# Patient Record
Sex: MALE | Race: WHITE | NOT HISPANIC OR LATINO | Employment: UNEMPLOYED | URBAN - METROPOLITAN AREA
[De-identification: names, ages, dates, MRNs, and addresses within clinical notes are randomized per-mention and may not be internally consistent; named-entity substitution may affect disease eponyms.]

---

## 2017-01-05 ENCOUNTER — APPOINTMENT (OUTPATIENT)
Dept: RADIOLOGY | Facility: MEDICAL CENTER | Age: 42
End: 2017-01-05
Attending: EMERGENCY MEDICINE

## 2017-01-05 ENCOUNTER — HOSPITAL ENCOUNTER (EMERGENCY)
Facility: MEDICAL CENTER | Age: 42
End: 2017-01-05

## 2017-01-05 ENCOUNTER — HOSPITAL ENCOUNTER (EMERGENCY)
Facility: MEDICAL CENTER | Age: 42
End: 2017-01-05
Attending: EMERGENCY MEDICINE

## 2017-01-05 VITALS
TEMPERATURE: 98.6 F | RESPIRATION RATE: 18 BRPM | BODY MASS INDEX: 38.95 KG/M2 | HEART RATE: 72 BPM | DIASTOLIC BLOOD PRESSURE: 78 MMHG | OXYGEN SATURATION: 96 % | HEIGHT: 68 IN | SYSTOLIC BLOOD PRESSURE: 132 MMHG | WEIGHT: 257 LBS

## 2017-01-05 VITALS
DIASTOLIC BLOOD PRESSURE: 76 MMHG | BODY MASS INDEX: 38.63 KG/M2 | TEMPERATURE: 97.9 F | HEART RATE: 86 BPM | WEIGHT: 261.69 LBS | OXYGEN SATURATION: 94 % | SYSTOLIC BLOOD PRESSURE: 141 MMHG | RESPIRATION RATE: 16 BRPM

## 2017-01-05 DIAGNOSIS — R16.1 SPLENOMEGALY: ICD-10-CM

## 2017-01-05 LAB
ALBUMIN SERPL BCP-MCNC: 4 G/DL (ref 3.2–4.9)
ALBUMIN/GLOB SERPL: 1.3 G/DL
ALP SERPL-CCNC: 79 U/L (ref 30–99)
ALT SERPL-CCNC: 39 U/L (ref 2–50)
ANION GAP SERPL CALC-SCNC: 7 MMOL/L (ref 0–11.9)
AST SERPL-CCNC: 21 U/L (ref 12–45)
BASOPHILS # BLD AUTO: 0.6 % (ref 0–1.8)
BASOPHILS # BLD: 0.07 K/UL (ref 0–0.12)
BILIRUB SERPL-MCNC: 0.6 MG/DL (ref 0.1–1.5)
BUN SERPL-MCNC: 12 MG/DL (ref 8–22)
CALCIUM SERPL-MCNC: 9 MG/DL (ref 8.4–10.2)
CHLORIDE SERPL-SCNC: 106 MMOL/L (ref 96–112)
CO2 SERPL-SCNC: 27 MMOL/L (ref 20–33)
CREAT SERPL-MCNC: 0.9 MG/DL (ref 0.5–1.4)
EOSINOPHIL # BLD AUTO: 0.2 K/UL (ref 0–0.51)
EOSINOPHIL NFR BLD: 1.8 % (ref 0–6.9)
ERYTHROCYTE [DISTWIDTH] IN BLOOD BY AUTOMATED COUNT: 41.1 FL (ref 35.9–50)
GFR SERPL CREATININE-BSD FRML MDRD: >60 ML/MIN/1.73 M 2
GLOBULIN SER CALC-MCNC: 3 G/DL (ref 1.9–3.5)
GLUCOSE SERPL-MCNC: 93 MG/DL (ref 65–99)
HCT VFR BLD AUTO: 47.6 % (ref 42–52)
HGB BLD-MCNC: 16.7 G/DL (ref 14–18)
IMM GRANULOCYTES # BLD AUTO: 0.04 K/UL (ref 0–0.11)
IMM GRANULOCYTES NFR BLD AUTO: 0.4 % (ref 0–0.9)
LYMPHOCYTES # BLD AUTO: 3.27 K/UL (ref 1–4.8)
LYMPHOCYTES NFR BLD: 28.9 % (ref 22–41)
MCH RBC QN AUTO: 30.6 PG (ref 27–33)
MCHC RBC AUTO-ENTMCNC: 35.1 G/DL (ref 33.7–35.3)
MCV RBC AUTO: 87.3 FL (ref 81.4–97.8)
MONOCYTES # BLD AUTO: 0.73 K/UL (ref 0–0.85)
MONOCYTES NFR BLD AUTO: 6.4 % (ref 0–13.4)
NEUTROPHILS # BLD AUTO: 7.01 K/UL (ref 1.82–7.42)
NEUTROPHILS NFR BLD: 61.9 % (ref 44–72)
NRBC # BLD AUTO: 0 K/UL
NRBC BLD AUTO-RTO: 0 /100 WBC
PLATELET # BLD AUTO: 192 K/UL (ref 164–446)
PMV BLD AUTO: 12.1 FL (ref 9–12.9)
POTASSIUM SERPL-SCNC: 3.8 MMOL/L (ref 3.6–5.5)
PROT SERPL-MCNC: 7 G/DL (ref 6–8.2)
RBC # BLD AUTO: 5.45 M/UL (ref 4.7–6.1)
SODIUM SERPL-SCNC: 140 MMOL/L (ref 135–145)
WBC # BLD AUTO: 11.3 K/UL (ref 4.8–10.8)

## 2017-01-05 PROCEDURE — 76700 US EXAM ABDOM COMPLETE: CPT

## 2017-01-05 PROCEDURE — 36415 COLL VENOUS BLD VENIPUNCTURE: CPT

## 2017-01-05 PROCEDURE — 99283 EMERGENCY DEPT VISIT LOW MDM: CPT

## 2017-01-05 PROCEDURE — 302449 STATCHG TRIAGE ONLY (STATISTIC): Mod: EDC

## 2017-01-05 PROCEDURE — 80053 COMPREHEN METABOLIC PANEL: CPT

## 2017-01-05 PROCEDURE — 85025 COMPLETE CBC W/AUTO DIFF WBC: CPT

## 2017-01-05 ASSESSMENT — PAIN SCALES - WONG BAKER: WONGBAKER_NUMERICALRESPONSE: DOESN'T HURT AT ALL

## 2017-01-05 NOTE — ED AVS SNAPSHOT
1/5/2017          Gil Mueller  [address] [place] [address]    Dear Gil:    [facility] wants to ensure your discharge home is safe and you or your loved ones have had all your questions answered regarding your care after you leave the hospital.    You may receive a telephone call within two days of your discharge.  This call is to make certain you understand your discharge instructions as well as ensure we provided you with the best care possible during your stay with us.     The call will only last approximately 3-5 minutes and will be done by a nurse.    Once again, we want to ensure your discharge home is safe and that you have a clear understanding of any next steps in your care.  If you have any questions or concerns, please do not hesitate to contact us, we are here for you.  Thank you for choosing [facility] for your healthcare needs.    Sincerely,    Del Boles    [facility]

## 2017-01-05 NOTE — ED AVS SNAPSHOT
After Visit Summary                                                                                                                Gil Mueller   MRN: [id number]    Department:  [facility], Emergency Dept   Date of Visit:  1/5/2017            [facility], Emergency Dept    [address]    [place] [address]    Phone:  [phone]      You were seen by     Gil Ramirez M.D.      Your Diagnosis Was     Splenomegaly     R16.1       Follow-up Information     1. Follow up with Steven Limon M.D. In 1 week.    Specialty:  Family Medicine    Contact information    [address]  [place] [address] [phone]        Medication Information     Review all of your home medications and newly ordered medications with your primary doctor and/or pharmacist as soon as possible. Follow medication instructions as directed by your doctor and/or pharmacist.     Please keep your complete medication list with you and share with your physician. Update the information when medications are discontinued, doses are changed, or new medications (including over-the-counter products) are added; and carry medication information at all times in the event of emergency situations.               Medication List      ASK your doctor about these medications        Instructions    gabapentin 600 MG tablet   Commonly known as:  NEURONTIN    Take 600 mg by mouth 3 times a day.   Dose:  600 mg       hydrocodone-acetaminophen 7.5-325 MG per tablet   Commonly known as:  NORCO    Take 1 Tab by mouth every 8 hours as needed (pain).   Dose:  1 Tab       ibuprofen 800 MG Tabs   Commonly known as:  MOTRIN    Take 1 Tab by mouth every 8 hours as needed (pain).   Dose:  800 mg       lisinopril 10 MG Tabs   Commonly known as:  PRINIVIL    Take 1 Tab by mouth every day.   Dose:  10 mg               Procedures and tests performed during your visit     CBC WITH DIFFERENTIAL    COMP METABOLIC PANEL     ESTIMATED GFR    US-ABDOMEN COMPLETE SURVEY        Discharge Instructions       Obtain outpatient H pylori evaluation     Enlarged Spleen  The spleen is an organ located in the upper abdomen under your left ribs. It is a spongelike organ, about the size of an orange, which acts as a filter. The spleen is part of the lymph system and filters the blood. It removes old blood cells and abnormal blood cells. It is also part of the immune response and helps fight infections. An enlarged spleen (splenomegaly) is usually noticed when it is almost twice its normal size.  CAUSES   There are many possible causes of an enlarged spleen. These causes include:  · Infections (viral, bacterial, or parasitic).  · Liver cirrhosis and other liver diseases.  · Hemolytic anemia (types of anemia that lower your red blood cell count) and other blood diseases.  · Hypersplenism (reduction in many types of blood cells by an enlarged spleen).  · Blood cancers (leukemia, Hodgkin's disease).  · Metabolic disorders (Gaucher's disease, Gomez-Pick disease).  · Tumors and cysts.  · Pressure or blood clots in the veins of the spleen.  · Connective tissue disorders (lupus, rheumatoid arthritis with Felty's syndrome).  SYMPTOMS   An enlarged spleen may not always cause symptoms. If symptoms do occur, they may include:  · Pain in the upper left abdomen (pain may spread to the left shoulder or get worse when you take a breath).  · Feeling full without eating or eating only a small amount.  · Feeling tired.  · Chronic infections.  · Bleeding easily.  DIAGNOSIS   Tests may include:  · Physical examination of the left upper abdomen.  · Blood tests to check red and white blood cells and other proteins and enzymes.  · Imaging tests, such as abdominal ultrasonography, computerized X-ray scan (computed tomography, CT), and computerized magnetic scan (magnetic resonance imaging, MRI).  · Taking a tissue sample (biopsy) of the liver to examine  it.  · Examining a bone marrow biopsy sample.  TREATMENT   Treatment varies depending on the cause of the enlarged spleen. Treatment aims to manage the conditions that cause swelling of the spleen and reduce the size of the spleen. Treatment may include:  · Medications to eliminate infection or treat disease.  · Radiation therapy.  · Blood transfusions.  · Vaccinations.  If these treatments are not successful, or the cause cannot be determined, surgery to remove the spleen (splenectomy) may be recommended.  HOME CARE INSTRUCTIONS   · Take all medications as directed.  · Take all antibiotics, even if you start to feel better. Discuss with your caregiver the use of a probiotic supplement to prevent stomach upset.  · To avoid injury or a ruptured spleen:  ¨ Limit activities as directed.  ¨ Avoid contact sports.  ¨ Wear your seat belt in the car.  · See your caregiver for vaccinations, follow up examinations and testing as directed.  · Follow all of your caregiver's instructions on managing the conditions that cause your enlarged spleen.  PREVENTION   It is not always possible to prevent an enlarged spleen. Reduce your chances of developing an enlarged spleen:  · Practice good hygiene to prevent infection.  · Get recommended vaccines to prevent infection.  SEEK MEDICAL CARE IF:   · You develop a fever (more than 100.5°F [38.1° C]) or other signs of infection (chills, feeling unwell).  · You experience injury or impact to the spleen area.  · Your symptoms do not go away as you and your doctor expected.  · You experience increased pain when you take in a breath.  · Your symptoms worsen, or you develop new symptoms.  MAKE SURE YOU:   · Understand these instructions.  · Will watch your condition.  · Will get help right away if you are not doing well or get worse.  Follow up with your caregiver to find out the results of your tests. Not all test results may be available during your visit. If your test results are not back  during the visit, make an appointment with your caregiver to find out the results. Do not assume everything is normal if you have not heard from your caregiver or the medical facility. It is important for you to follow up on all of your test results.      This information is not intended to replace advice given to you by your health care provider. Make sure you discuss any questions you have with your health care provider.     Document Released: 06/07/2011 Document Revised: 01/08/2016 Document Reviewed: 06/07/2011  [employer] Interactive Patient Education ©2016 [employer]            Patient Information     Patient Information    Following emergency treatment: all patient requiring follow-up care must return either to a private physician or a clinic if your condition worsens before you are able to obtain further medical attention, please return to the emergency room.     Billing Information    At [facility], we work to make the billing process streamlined for our patients.  Our Representatives are here to answer any questions you may have regarding your hospital bill.  If you have insurance coverage and have supplied your insurance information to us, we will submit a claim to your insurer on your behalf.  Should you have any questions regarding your bill, we can be reached online or by phone as follows:  Online: You are able pay your bills online or live chat with our representatives about any billing questions you may have. We are here to help Monday - Friday from 8:00am to 7:30pm and 9:00am - 12:00pm on Saturdays.  Please visit [email or web address]  for more information.   Phone:  [phone] or [phone]    Please note that your emergency physician, surgeon, pathologist, radiologist, anesthesiologist, and other specialists are not employed by [facility] and will therefore bill separately for their services.  Please contact them directly for any questions concerning their  bills at the numbers below:     Emergency Physician Services:  [phone]  [facility]:  [phone]  [facility]:  [phone]  [facility]:  [phone]    1. Your final bill may vary from the amount quoted upon discharge if all procedures are not complete at that time, or if your doctor has additional procedures of which we are not aware. You will receive an additional bill if you return to the Emergency Department at [facility] for suture removal regardless of the facility of which the sutures were placed.     2. Please arrange for settlement of this account at the emergency registration.    3. All self-pay accounts are due in full at the time of treatment.  If you are unable to meet this obligation then payment is expected within 4-5 days.     4. If you have had radiology studies (CT, X-ray, Ultrasound, MRI), you have received a preliminary result during your emergency department visit. Please contact the radiology department [phone] to receive a copy of your final result. Please discuss the Final result with your primary physician or with the follow up physician provided.     Crisis Hotline:  [place] Crisis Hotline:  6-857-WIGKXRA or 1-358.673.3078  [place] Crisis Hotline:    1-755.789.2499 or 323-330-5384         ED Discharge Follow Up Questions    1. In order to provide you with very good care, we would like to follow up with a phone call in the next few days.  May we have your permission to contact you?     YES /  NO    2. What is the best phone number to call you? (       )_____-__________    3. What is the best time to call you?      Morning  /  Afternoon  /  Evening                   Patient Signature:  ____________________________________________________________    Date:  ____________________________________________________________

## 2017-01-05 NOTE — ED AVS SNAPSHOT
Blacksumac Access Code: [email or web address]  Expires: 2/4/2017  8:24 PM    Blacksumac  A secure, online tool to manage your health information     [email or web address]’s Blacksumac® is a secure, online tool that connects you to your personalized health information from the privacy of your home -- day or night - making it very easy for you to manage your healthcare. Once the activation process is completed, you can even access your medical information using the Blacksumac el, which is available for free in the Apple El store or Google Play store.     Blacksumac provides the following levels of access (as shown below):   My Chart Features   [facility] Primary Care Doctor [facility]  Specialists [facility]  Urgent  Care [facility]  Primary Care  Doctor   Email your healthcare team securely and privately 24/7 X X X X   Manage appointments: schedule your next appointment; view details of past/upcoming appointments X      Request prescription refills. X      View recent personal medical records, including lab and immunizations X X X X   View health record, including health history, allergies, medications X X X X   Read reports about your outpatient visits, procedures, consult and ER notes X X X X   See your discharge summary, which is a recap of your hospital and/or ER visit that includes your diagnosis, lab results, and care plan. X X       How to register for Blacksumac:  1. Go to  [email or web address].  2. Click on the Sign Up Now box, which takes you to the New Member Sign Up page. You will need to provide the following information:  a. Enter your Blacksumac Access Code exactly as it appears at the top of this page. (You will not need to use this code after you’ve completed the sign-up process. If you do not sign up before the expiration date, you must request a new code.)   b. Enter your date of birth.   c. Enter your home email address.   d. Click Submit, and follow the next screen’s instructions.  3. Create a Blacksumac ID. This will be your [employer]  login ID and cannot be changed, so think of one that is secure and easy to remember.  4. Create a [employer] password. You can change your password at any time.  5. Enter your Password Reset Question and Answer. This can be used at a later time if you forget your password.   6. Enter your e-mail address. This allows you to receive e-mail notifications when new information is available in [employer].  7. Click Sign Up. You can now view your health information.    For assistance activating your [employer] account, call [phone]

## 2017-01-05 NOTE — ED NOTES
Pt had MRI done last week. MD called pt to notify him that he has an enlarged spleen and that he needs to come to ER. Pt in NAD. VSS. Charge RN aware.

## 2017-01-05 NOTE — ED NOTES
Patient called again in lobby and [facility] with [facility] response - assume left prior to treatment rendered.

## 2017-01-06 NOTE — DISCHARGE INSTRUCTIONS
Obtain outpatient H pylori evaluation     Enlarged Spleen  The spleen is an organ located in the upper abdomen under your left ribs. It is a spongelike organ, about the size of an orange, which acts as a filter. The spleen is part of the lymph system and filters the blood. It removes old blood cells and abnormal blood cells. It is also part of the immune response and helps fight infections. An enlarged spleen (splenomegaly) is usually noticed when it is almost twice its normal size.  CAUSES   There are many possible causes of an enlarged spleen. These causes include:  · Infections (viral, bacterial, or parasitic).  · Liver cirrhosis and other liver diseases.  · Hemolytic anemia (types of anemia that lower your red blood cell count) and other blood diseases.  · Hypersplenism (reduction in many types of blood cells by an enlarged spleen).  · Blood cancers (leukemia, Hodgkin's disease).  · Metabolic disorders (Gaucher's disease, Gomez-Pick disease).  · Tumors and cysts.  · Pressure or blood clots in the veins of the spleen.  · Connective tissue disorders (lupus, rheumatoid arthritis with Felty's syndrome).  SYMPTOMS   An enlarged spleen may not always cause symptoms. If symptoms do occur, they may include:  · Pain in the upper left abdomen (pain may spread to the left shoulder or get worse when you take a breath).  · Feeling full without eating or eating only a small amount.  · Feeling tired.  · Chronic infections.  · Bleeding easily.  DIAGNOSIS   Tests may include:  · Physical examination of the left upper abdomen.  · Blood tests to check red and white blood cells and other proteins and enzymes.  · Imaging tests, such as abdominal ultrasonography, computerized X-ray scan (computed tomography, CT), and computerized magnetic scan (magnetic resonance imaging, MRI).  · Taking a tissue sample (biopsy) of the liver to examine it.  · Examining a bone marrow biopsy sample.  TREATMENT   Treatment varies depending on the  cause of the enlarged spleen. Treatment aims to manage the conditions that cause swelling of the spleen and reduce the size of the spleen. Treatment may include:  · Medications to eliminate infection or treat disease.  · Radiation therapy.  · Blood transfusions.  · Vaccinations.  If these treatments are not successful, or the cause cannot be determined, surgery to remove the spleen (splenectomy) may be recommended.  HOME CARE INSTRUCTIONS   · Take all medications as directed.  · Take all antibiotics, even if you start to feel better. Discuss with your caregiver the use of a probiotic supplement to prevent stomach upset.  · To avoid injury or a ruptured spleen:  ¨ Limit activities as directed.  ¨ Avoid contact sports.  ¨ Wear your seat belt in the car.  · See your caregiver for vaccinations, follow up examinations and testing as directed.  · Follow all of your caregiver's instructions on managing the conditions that cause your enlarged spleen.  PREVENTION   It is not always possible to prevent an enlarged spleen. Reduce your chances of developing an enlarged spleen:  · Practice good hygiene to prevent infection.  · Get recommended vaccines to prevent infection.  SEEK MEDICAL CARE IF:   · You develop a fever (more than 100.5°F [38.1° C]) or other signs of infection (chills, feeling unwell).  · You experience injury or impact to the spleen area.  · Your symptoms do not go away as you and your doctor expected.  · You experience increased pain when you take in a breath.  · Your symptoms worsen, or you develop new symptoms.  MAKE SURE YOU:   · Understand these instructions.  · Will watch your condition.  · Will get help right away if you are not doing well or get worse.  Follow up with your caregiver to find out the results of your tests. Not all test results may be available during your visit. If your test results are not back during the visit, make an appointment with your caregiver to find out the results. Do not  assume everything is normal if you have not heard from your caregiver or the medical facility. It is important for you to follow up on all of your test results.      This information is not intended to replace advice given to you by your health care provider. Make sure you discuss any questions you have with your health care provider.     Document Released: 06/07/2011 Document Revised: 01/08/2016 Document Reviewed: 06/07/2011  Elsevier Interactive Patient Education ©2016 [employer].

## 2017-01-06 NOTE — ED NOTES
Assumed care of pt from Fletcher silva. Aware of pending u/s. Labs drawn by previous rn. Pt requesting oxycodone for previous back surgery. erp aware, not give as pt drove self to er and cannot find ride. Pt aware of same.

## 2017-01-06 NOTE — ED PROVIDER NOTES
ED Provider Note    CHIEF COMPLAINT  Chief Complaint   Patient presents with   • Sent by MD     enlarged spleen       HPI  Gil Mueller is a 41 y.o. male who presents after being referred in by his primary care doctor. He was told that he might have an enlarged spleen is seen on a CT scan of his belly. Patient denies any abdominal pain. Patient denies fever chills. No cough or cold symptoms. No vomiting or diarrhea. Patient is otherwise well. Patient states he has chronic back pain which she takes oxycodone for. He denies alcohol usage.    REVIEW OF SYSTEMS  See HPI for further details.  All other systems are negative.    PAST MEDICAL HISTORY  Past Medical History   Diagnosis Date   • Lumbar herniated disc    • Pain        FAMILY HISTORY  Family History   Problem Relation Age of Onset   • Cancer Mother      pancreas, liver   • Diabetes Neg Hx    • Heart Disease Neg Hx    • Stroke Neg Hx        SOCIAL HISTORY  Social History     Social History   • Marital Status:      Spouse Name: N/A   • Number of Children: N/A   • Years of Education: N/A     Social History Main Topics   • Smoking status: Former Smoker     Types: Cigarettes     Quit date: 03/12/2004   • Smokeless tobacco: Current User     Types: Chew   • Alcohol Use: Yes      Comment: socially   • Drug Use: No      Comment: DC'd  meth 2005   • Sexual Activity:     Partners: Female      Comment: ,      Other Topics Concern   • None     Social History Narrative       SURGICAL HISTORY  Past Surgical History   Procedure Laterality Date   • Other orthopedic surgery  age 27     L4-L5 discectomy   • Knee arthroscopy Left 2013     medial meniscus tear   • Hernia repair Left age 13   • Lumbar fusion posterior Right 7/26/2016     Procedure: LUMBAR FUSION POSTERIOR For: Coflex Stabilization L4-5;  Surgeon: Jose Benites M.D.;  Location: SUR[facility];  Service:    • Lumbar laminectomy diskectomy Right 7/26/2016     Procedure:  "LUMBAR LAMINECTOMY DISKECTOMY L4-5 Lami;  Surgeon: Jose Benites M.D.;  Location: SURGERY [facility];  Service:        CURRENT MEDICATIONS  @ He is Tim@    ALLERGIES  No Known Allergies    PHYSICAL EXAM  VITAL SIGNS: /78 mmHg  Pulse 70  Temp(Src) 37 °C (98.6 °F)  Resp 18  Ht 1.727 m (5' 8\")  Wt 116.574 kg (257 lb)  BMI 39.09 kg/m2  SpO2 97%  Constitutional: Well developed, Well nourished, No acute distress, Non-toxic appearance.   HENT: Normocephalic, Atraumatic, Bilateral external ears normal, Oropharynx moist, No oral exudates, Nose normal.   Eyes: SINDI, EOMI, Conjunctiva normal, No discharge.   Neck: Normal range of motion, No tenderness, Supple,   Lymphatic: No lymphadenopathy noted.   Cardiovascular: Normal heart rate, Normal rhythm, No murmurs, No rubs, No gallops.   Thorax & Lungs: Normal breath sounds, No respiratory distress,   Abdomen: Normal bowel sounds soft. Nontender. No spleen tip palpable  Musculoskeletal: Normal upper and lower extremities  Skin: Warm, Dry, No erythema, No rash.   Back: No tenderness, No CVA tenderness.   Extremities: No edema, No tenderness, No cyanosis, No clubbing. Dorsalis pedis pulses 2+ equal bilaterally. Radial pulses 2+ equal bilaterally    RADIOLOGY/PROCEDURES  US-ABDOMEN COMPLETE SURVEY   Final Result      Borderline splenomegaly. Unremarkable findings otherwise.               Results for orders placed or performed during the hospital encounter of 01/05/17   CBC WITH DIFFERENTIAL   Result Value Ref Range    WBC 11.3 (H) 4.8 - 10.8 K/uL    RBC 5.45 4.70 - 6.10 M/uL    Hemoglobin 16.7 14.0 - 18.0 g/dL    Hematocrit 47.6 42.0 - 52.0 %    MCV 87.3 81.4 - 97.8 fL    MCH 30.6 27.0 - 33.0 pg    MCHC 35.1 33.7 - 35.3 g/dL    RDW 41.1 35.9 - 50.0 fL    Platelet Count 192 164 - 446 K/uL    MPV 12.1 9.0 - 12.9 fL    Neutrophils-Polys 61.90 44.00 - 72.00 %    Lymphocytes 28.90 22.00 - 41.00 %    Monocytes 6.40 0.00 - 13.40 %    Eosinophils 1.80 0.00 - 6.90 %    " Basophils 0.60 0.00 - 1.80 %    Immature Granulocytes 0.40 0.00 - 0.90 %    Nucleated RBC 0.00 /100 WBC    Neutrophils (Absolute) 7.01 1.82 - 7.42 K/uL    Lymphs (Absolute) 3.27 1.00 - 4.80 K/uL    Monos (Absolute) 0.73 0.00 - 0.85 K/uL    Eos (Absolute) 0.20 0.00 - 0.51 K/uL    Baso (Absolute) 0.07 0.00 - 0.12 K/uL    Immature Granulocytes (abs) 0.04 0.00 - 0.11 K/uL    NRBC (Absolute) 0.00 K/uL   COMP METABOLIC PANEL   Result Value Ref Range    Sodium 140 135 - 145 mmol/L    Potassium 3.8 3.6 - 5.5 mmol/L    Chloride 106 96 - 112 mmol/L    Co2 27 20 - 33 mmol/L    Anion Gap 7.0 0.0 - 11.9    Glucose 93 65 - 99 mg/dL    Bun 12 8 - 22 mg/dL    Creatinine 0.90 0.50 - 1.40 mg/dL    Calcium 9.0 8.4 - 10.2 mg/dL    AST(SGOT) 21 12 - 45 U/L    ALT(SGPT) 39 2 - 50 U/L    Alkaline Phosphatase 79 30 - 99 U/L    Total Bilirubin 0.6 0.1 - 1.5 mg/dL    Albumin 4.0 3.2 - 4.9 g/dL    Total Protein 7.0 6.0 - 8.2 g/dL    Globulin 3.0 1.9 - 3.5 g/dL    A-G Ratio 1.3 g/dL   ESTIMATED GFR   Result Value Ref Range    GFR If African American >60 >60 mL/min/1.73 m 2    GFR If Non African American >60 >60 mL/min/1.73 m 2         COURSE & MEDICAL DECISION MAKING  Pertinent Labs & Imaging studies reviewed. (See chart for details)  Patient a blood work obtained. His CBC is essentially normal. His white count is minimally elevated 11,000. His blood count is normal. His left lites are normal. LFTs are normal. Ultrasound also shows showed splenomegaly. At this point do not think the patient needs to be admitted. There is no acute sign of splenic trauma or sequestration. Patient was reassured and will be discharged home. I recommend following up his primary care physician. He may or may not benefit from hematology referral. Patient is discharged home in stable condition    FINAL IMPRESSION  1. Borderline splenomegaly  2.   3.    Please note that this dictation was created using voice recognition software. I have worked with consultants from the  vendor as well as technical experts from [facility] to optimize the interface. I have made every reasonable attempt to correct obvious errors, but I expect that there are errors of grammar and possibly content that I did not discover before finalizing the note.    Electronically signed by: Gil Ramirez, 1/5/2017 8:15 PM

## 2017-04-18 ENCOUNTER — OFFICE VISIT (OUTPATIENT)
Dept: MEDICAL GROUP | Facility: MEDICAL CENTER | Age: 42
End: 2017-04-18
Attending: FAMILY MEDICINE
Payer: MEDICAID

## 2017-04-18 VITALS
HEIGHT: 69 IN | TEMPERATURE: 99.1 F | WEIGHT: 263 LBS | BODY MASS INDEX: 38.95 KG/M2 | HEART RATE: 76 BPM | OXYGEN SATURATION: 96 % | RESPIRATION RATE: 16 BRPM | DIASTOLIC BLOOD PRESSURE: 78 MMHG | SYSTOLIC BLOOD PRESSURE: 122 MMHG

## 2017-04-18 DIAGNOSIS — M51.26 LUMBAR HERNIATED DISC: ICD-10-CM

## 2017-04-18 DIAGNOSIS — E66.9 OBESITY (BMI 30-39.9): ICD-10-CM

## 2017-04-18 DIAGNOSIS — R16.1 SPLENOMEGALY: ICD-10-CM

## 2017-04-18 DIAGNOSIS — Z79.891 USE OF OPIATES FOR THERAPEUTIC PURPOSES: ICD-10-CM

## 2017-04-18 PROCEDURE — 99214 OFFICE O/P EST MOD 30 MIN: CPT | Performed by: FAMILY MEDICINE

## 2017-04-18 RX ORDER — OXYCODONE HYDROCHLORIDE 10 MG/1
10 TABLET ORAL 2 TIMES DAILY PRN
Qty: 42 TAB | Refills: 0 | Status: SHIPPED | OUTPATIENT
Start: 2017-04-18 | End: 2017-04-28

## 2017-04-18 ASSESSMENT — PAIN SCALES - GENERAL: PAINLEVEL: NO PAIN

## 2017-04-18 ASSESSMENT — PATIENT HEALTH QUESTIONNAIRE - PHQ9: CLINICAL INTERPRETATION OF PHQ2 SCORE: 0

## 2017-04-18 NOTE — MR AVS SNAPSHOT
"        Gil Mueller   2017 11:10 AM   Office Visit   MRN: [id number]    Department[facility]   Dept Phone:  [phone]    Description:  Male : 1975   Provider:  Steven Limon M.D.           Reason for Visit     Nail Problem     Other enlarged spleen       Allergies as of 2017     No Known Allergies      You were diagnosed with     Obesity (BMI 30-39.9)   [583414]       Use of opiates for therapeutic purposes   [3246885]       Splenomegaly   [789.2.ICD-9-CM]         Vital Signs     Blood Pressure Pulse Temperature Respirations Height Weight    122/78 mmHg 76 37.3 °C (99.1 °F) 16 1.753 m (5' 9\") 119.296 kg (263 lb)    Body Mass Index Oxygen Saturation Smoking Status             38.82 kg/m2 96% Former Smoker         Basic Information     Date Of Birth Sex Race Ethnicity Preferred Language    1975 Male White Non- English      Your appointments     2017  8:45 AM   Scheduled Pre Admission with PREADMIT 5   [facility] [place] [facility] [place]    [address] [place] [id number] [phone]            May 09, 2017  9:10 AM   Established Patient with Steven Limon M.D.   [facility] ([facility])    [address] [place] [id number] [phone]           You will be receiving a confirmation call a few days before your appointment from our automated call confirmation system.              Problem List              ICD-10-CM Priority Class Noted - Resolved    Lumbar herniated disc M51.26   Unknown - Present    Acute pain of right knee M25.561   2015 - Present    Morbid obesity (CMS-HCC) E66.01   2015 - Present    HTN (hypertension) I10   2015 - Present    Lumbar radiculopathy M54.16   2016 - Present      Health Maintenance        Date Due Completion Dates    IMM DTaP/Tdap/Td Vaccine (1 - Tdap) 1993 ---            Current Immunizations     No immunizations on file.      Below and/or attached are the medications your provider " expects you to take. Review all of your home medications and newly ordered medications with your provider and/or pharmacist. Follow medication instructions as directed by your provider and/or pharmacist. Please keep your medication list with you and share with your provider. Update the information when medications are discontinued, doses are changed, or new medications (including over-the-counter products) are added; and carry medication information at all times in the event of emergency situations     Allergies:  No Known Allergies          Medications  Valid as of: April 18, 2017 - 11:57 AM    Generic Name Brand Name Tablet Size Instructions for use    Gabapentin (Tab) NEURONTIN 600 MG Take 600 mg by mouth 3 times a day.        Hydrocodone-Acetaminophen (Tab) NORCO 7.5-325 MG Take 1 Tab by mouth every 8 hours as needed (pain).        Ibuprofen (Tab) MOTRIN 800 MG Take 1 Tab by mouth every 8 hours as needed (pain).        Lisinopril (Tab) PRINIVIL 10 MG Take 1 Tab by mouth every day.        OxyCODONE HCl (Tab) ROXICODONE 10 MG Take 1 Tab by mouth 2 times a day as needed for Moderate Pain.        .                 Medicines prescribed today were sent to:     None      Medication refill instructions:       If your prescription bottle indicates you have medication refills left, it is not necessary to call your provider’s office. Please contact your pharmacy and they will refill your medication.    If your prescription bottle indicates you do not have any refills left, you may request refills at any time through one of the following ways: The online [employer] system (except Urgent Care), by calling your provider’s office, or by asking your pharmacy to contact your provider’s office with a refill request. Medication refills are processed only during regular business hours and may not be available until the next business day. Your provider may request additional information or to have a follow-up visit with you prior to  refilling your medication.   *Please Note: Medication refills are assigned a new Rx number when refilled electronically. Your pharmacy may indicate that no refills were authorized even though a new prescription for the same medication is available at the pharmacy. Please request the medicine by name with the pharmacy before contacting your provider for a refill.        Your To Do List     Future Labs/Procedures Complete By Expires    US-ABDOMEN COMPLETE SURVEY  As directed 10/19/2017      Referral     A referral request has been sent to our patient care coordination department. Please allow 3-5 business days for us to process this request and contact you either by phone or mail. If you do not hear from us by the 5th business day, please call us at [phone].        Other Notes About Your Plan     Fall Risk Done 07/31/2015           [employer] Access Code: Activation code not generated  Current [employer] Status: Active          Quit Tobacco Information     Do you want to quit using tobacco?    Quitting tobacco decreases risks of cancer, heart and lung disease, increases life expectancy, improves sense of taste and smell, and increases spending money, among other benefits.    If you are thinking about quitting, we can help.  • [facility] Quit Tobacco Program: 256.535.4015  o Program occurs weekly for four weeks and includes pharmacist consultation on products to support quitting smoking or chewing tobacco. A provider referral is needed for pharmacist consultation.  • Tobacco Users Help Hotline: 5-366-QUIT-NOW (768-3017) or https://[place].quitlogix.org/  o Free, confidential telephone and online coaching for [place] residents. Sessions are designed on a schedule that is convenient for you. Eligible clients receive free nicotine replacement therapy.  • Nationally: www.smokefree.gov  o Information and professional assistance to support both immediate and long-term needs as you become, and remain, a non-smoker. Smokefree.gov  allows you to choose the help that best fits your needs.

## 2017-04-18 NOTE — Clinical Note
April 18, 2017    Re:    Gil Mueller  [address]  [place] [address]        Dear Sir,    Gil is evaluated today for recent finding of splenomegaly. Ultrasound on 1/5/17 was notable for very minimal splenomegaly at 14 cm length with normal spleen being up to 13 cm. CBC at that time was unremarkable with no evidence of white blood cell dysfunction. Ultrasound is being repeated. Abdominal exam today was unremarkable. I do not feel that patient's current splenomegaly, which appears to be mild, should be a significant impediment to contemplated posterior approach spinal surgery. We are updating his cholesterol CBC, and will also arrange for second opinion from hematology.    If you have any questions or concerns, please don't hesitate to call.        Sincerely,        Steven Limon M.D.    Electronically Signed

## 2017-04-19 ENCOUNTER — APPOINTMENT (OUTPATIENT)
Dept: ADMISSIONS | Facility: MEDICAL CENTER | Age: 42
End: 2017-04-19
Payer: COMMERCIAL

## 2017-04-20 ENCOUNTER — APPOINTMENT (OUTPATIENT)
Dept: ADMISSIONS | Facility: MEDICAL CENTER | Age: 42
End: 2017-04-20
Payer: COMMERCIAL

## 2017-04-20 ENCOUNTER — TELEPHONE (OUTPATIENT)
Dept: HEMATOLOGY ONCOLOGY | Facility: MEDICAL CENTER | Age: 42
End: 2017-04-20

## 2017-04-20 NOTE — TELEPHONE ENCOUNTER
Left voicemail for patient requesting a return call to schedule New patient Hematology appointment. Ref: Steven Limon M.D., Dx: Splenomegaly

## 2017-04-24 PROBLEM — E66.9 OBESITY (BMI 30-39.9): Status: ACTIVE | Noted: 2017-04-24

## 2017-04-24 PROBLEM — R16.1 SPLENOMEGALY: Status: ACTIVE | Noted: 2017-04-24

## 2017-04-24 NOTE — ASSESSMENT & PLAN NOTE
Patient reports his back surgeons became very concerned with reference to mild splenomegaly noted incidentally on ER requested abdominal ultrasound January 2017. Patient has no prior history of splenomegaly, unexplained bleeding, or anemia. Normal spleen heights can be up to 13 cm. Patient has not had any petechiae, rashes, unexplained fevers. Appetite is normal.

## 2017-04-24 NOTE — PROGRESS NOTES
Chief Complaint   Patient presents with   • Nail Problem   • Other     enlarged spleen        HISTORY OF PRESENT ILLNESS: Patient is a 41 y.o. male established patient who presents today to follow-up on question of splenomegaly, chronic lumbar pain, obesity        Lumbar herniated disc  Patient has recently been seen by Dr. Townsend who is planning a new lumbar disc surgery (left-sided microdiscectomy L4-5). Patient has a chronic history of lumbar pain following previous disc surgery. Currently is taking oxycodone 10 mg twice daily. Denies constipation, diaphoresis, confusion. Notes daily low back pain left side    Splenomegaly  Patient reports his back surgeons became very concerned with reference to mild splenomegaly noted incidentally on ER requested abdominal ultrasound January 2017. Patient has no prior history of splenomegaly, unexplained bleeding, or anemia. Normal spleen heights can be up to 13 cm. Patient has not had any petechiae, rashes, unexplained fevers. Appetite is normal.    Obesity (BMI 30-39.9)  Patient denies any recent dramatic shifts in body weight up or down. Denies current hair loss or cold intolerance.      Patient Active Problem List    Diagnosis Date Noted   • Splenomegaly 04/24/2017   • Obesity (BMI 30-39.9) 04/24/2017   • Lumbar radiculopathy 07/26/2016   • HTN (hypertension) 08/21/2015   • Acute pain of right knee 07/31/2015   • Morbid obesity (CMS-HCC) 07/31/2015   • Lumbar herniated disc       Social history-single, not working  Allergies:Review of patient's allergies indicates no known allergies.    Current Outpatient Prescriptions   Medication Sig Dispense Refill   • oxycodone immediate release (ROXICODONE) 10 MG immediate release tablet Take 1 Tab by mouth 2 times a day as needed for Moderate Pain. 42 Tab 0   • gabapentin (NEURONTIN) 600 MG tablet Take 600 mg by mouth 3 times a day.     • lisinopril (PRINIVIL) 10 MG Tab Take 1 Tab by mouth every day. 30 Tab 11   • ibuprofen (MOTRIN)  "800 MG TABS Take 1 Tab by mouth every 8 hours as needed (pain). 90 Tab 1     No current facility-administered medications for this visit.       Social History   Substance Use Topics   • Smoking status: Former Smoker     Types: Cigarettes     Quit date: 03/12/2004   • Smokeless tobacco: Current User     Types: Chew   • Alcohol Use: Yes      Comment: socially       Family History   Problem Relation Age of Onset   • Cancer Mother      pancreas, liver   • Diabetes Neg Hx    • Heart Disease Neg Hx    • Stroke Neg Hx        ROS:  Review of Systems   Constitutional: Negative for fever, chills, weight loss and malaise/fatigue.   Eyes: Negative for blurred vision.   Respiratory: Negative for cough, sputum production, shortness of breath and wheezing.    Cardiovascular: Negative for chest pain, palpitations, orthopnea and leg swelling.   Gastrointestinal: Negative for heartburn, nausea, vomiting and abdominal pain.   Endo/Heme/Allergies: Does not bruise/bleed easily.               Exam:  Blood pressure 122/78, pulse 76, temperature 37.3 °C (99.1 °F), resp. rate 16, height 1.753 m (5' 9\"), weight 119.296 kg (263 lb), SpO2 96 %.  General:  Well nourished, well developed male in NAD  Head is grossly normal.  Neck: Supple without JVD or bruit. Thyroid is not enlarged. Trachea is midline.  Pulmonary: Clear to ausculation .  Normal effort. No rales, ronchi, or wheezing.  Cardiovascular: Regular rate and rhythm without murmur.  Abdomen-Abdomen is soft, normal bowel sounds, no masses, guarding, ororganomegaly, or tenderness. I do not palpate a splenic edge in the left upper quadrant.  Lower extremities- neg for pretibial edema, redness, tenderness.  Back-1+ tender in the midline from L3-S2 without overlying swelling or redness.    Please note that this dictation was created using voice recognition software. I have made every reasonable attempt to correct obvious errors, but I expect that there are errors of grammar and possibly " content that I did not discover before finalizing the note.    Assessment/Plan:  1. Obesity (BMI 30-39.9)  Patient identified as having weight management issue.  Appropriate orders and counseling given.   2. Use of opiates for therapeutic purposes     3. Splenomegaly  US-ABDOMEN COMPLETE SURVEY    REFERRAL TO HEMATOLOGY ONCOLOGY Referral to?: [facility]   4. Lumbar herniated disc        Plan: 1. Repeat abdominal ultrasound to confirm stability of minimal splenic enlargement  2. Hematology second opinion regarding stability of mild splenomegaly  3. Update CBC  4. Patient cleared for pending spine surgery-I do not feel that the minimal splenic enlargement represents a significant risk for his lumbar microdiscectomy surgery. Patient is reporting they are planning a posterior not anterior approach.  5. Will cover with oxycodone 10 mg twice daily for the next 3 weeks leading up to his scheduled surgery

## 2017-04-24 NOTE — ASSESSMENT & PLAN NOTE
Patient denies any recent dramatic shifts in body weight up or down. Denies current hair loss or cold intolerance.

## 2017-04-24 NOTE — ASSESSMENT & PLAN NOTE
Patient has recently been seen by Dr. Townsend who is planning a new lumbar disc surgery. Patient has a chronic history of lumbar pain following previous disc surgery. Currently is taking oxycodone 10 mg twice daily. Denies constipation, diaphoresis, confusion.

## 2017-04-28 ENCOUNTER — HOSPITAL ENCOUNTER (OUTPATIENT)
Dept: RADIOLOGY | Facility: MEDICAL CENTER | Age: 42
End: 2017-04-28
Attending: ORTHOPAEDIC SURGERY | Admitting: ORTHOPAEDIC SURGERY
Payer: COMMERCIAL

## 2017-04-28 PROCEDURE — 71020 DX-CHEST-2 VIEWS: CPT

## 2017-05-04 RX ORDER — MIDAZOLAM HYDROCHLORIDE 1 MG/ML
INJECTION INTRAMUSCULAR; INTRAVENOUS
Status: DISPENSED
Start: 2017-05-04 | End: 2017-05-05

## 2017-05-08 ENCOUNTER — APPOINTMENT (OUTPATIENT)
Dept: RADIOLOGY | Facility: MEDICAL CENTER | Age: 42
End: 2017-05-08
Attending: ORTHOPAEDIC SURGERY
Payer: COMMERCIAL

## 2017-05-08 ENCOUNTER — HOSPITAL ENCOUNTER (OUTPATIENT)
Facility: MEDICAL CENTER | Age: 42
End: 2017-05-08
Attending: ORTHOPAEDIC SURGERY | Admitting: ORTHOPAEDIC SURGERY
Payer: COMMERCIAL

## 2017-05-08 VITALS
HEART RATE: 82 BPM | TEMPERATURE: 97 F | BODY MASS INDEX: 39.69 KG/M2 | SYSTOLIC BLOOD PRESSURE: 120 MMHG | HEIGHT: 68 IN | OXYGEN SATURATION: 94 % | DIASTOLIC BLOOD PRESSURE: 63 MMHG | RESPIRATION RATE: 20 BRPM | WEIGHT: 261.91 LBS

## 2017-05-08 PROBLEM — M51.27 LUMBAGO-SCIATICA DUE TO DISPLACEMENT OF LUMBAR INTERVERTEBRAL DISC: Status: ACTIVE | Noted: 2017-05-08

## 2017-05-08 PROCEDURE — 160047 HCHG PACU  - EA ADDL 30 MINS PHASE II: Performed by: ORTHOPAEDIC SURGERY

## 2017-05-08 PROCEDURE — A9270 NON-COVERED ITEM OR SERVICE: HCPCS

## 2017-05-08 PROCEDURE — 160002 HCHG RECOVERY MINUTES (STAT): Performed by: ORTHOPAEDIC SURGERY

## 2017-05-08 PROCEDURE — 160048 HCHG OR STATISTICAL LEVEL 1-5: Performed by: ORTHOPAEDIC SURGERY

## 2017-05-08 PROCEDURE — 501445 HCHG STAPLER, SKIN DISP: Performed by: ORTHOPAEDIC SURGERY

## 2017-05-08 PROCEDURE — 160025 RECOVERY II MINUTES (STATS): Performed by: ORTHOPAEDIC SURGERY

## 2017-05-08 PROCEDURE — 501838 HCHG SUTURE GENERAL: Performed by: ORTHOPAEDIC SURGERY

## 2017-05-08 PROCEDURE — 72020 X-RAY EXAM OF SPINE 1 VIEW: CPT

## 2017-05-08 PROCEDURE — 500367 HCHG DRAIN KIT, HEMOVAC: Performed by: ORTHOPAEDIC SURGERY

## 2017-05-08 PROCEDURE — 700111 HCHG RX REV CODE 636 W/ 250 OVERRIDE (IP)

## 2017-05-08 PROCEDURE — A4450 NON-WATERPROOF TAPE: HCPCS | Performed by: ORTHOPAEDIC SURGERY

## 2017-05-08 PROCEDURE — 500440 HCHG DRESSING, STERILE ROLL (KERLIX): Performed by: ORTHOPAEDIC SURGERY

## 2017-05-08 PROCEDURE — 500125 HCHG BOVIE, HANDLE: Performed by: ORTHOPAEDIC SURGERY

## 2017-05-08 PROCEDURE — 160041 HCHG SURGERY MINUTES - EA ADDL 1 MIN LEVEL 4: Performed by: ORTHOPAEDIC SURGERY

## 2017-05-08 PROCEDURE — 160046 HCHG PACU - 1ST 60 MINS PHASE II: Performed by: ORTHOPAEDIC SURGERY

## 2017-05-08 PROCEDURE — 500864 HCHG NEEDLE, SPINAL 18G: Performed by: ORTHOPAEDIC SURGERY

## 2017-05-08 PROCEDURE — 700101 HCHG RX REV CODE 250

## 2017-05-08 PROCEDURE — 700102 HCHG RX REV CODE 250 W/ 637 OVERRIDE(OP)

## 2017-05-08 PROCEDURE — 502626 HCHG SURGIFLO HEMOSTATIC MATRIX 6ML: Performed by: ORTHOPAEDIC SURGERY

## 2017-05-08 PROCEDURE — 500885 HCHG PACK, JACKSON TABLE: Performed by: ORTHOPAEDIC SURGERY

## 2017-05-08 PROCEDURE — 501899 HCHG DURASEAL SEALANT SYSTEM 5ML: Performed by: ORTHOPAEDIC SURGERY

## 2017-05-08 PROCEDURE — 160009 HCHG ANES TIME/MIN: Performed by: ORTHOPAEDIC SURGERY

## 2017-05-08 PROCEDURE — 160029 HCHG SURGERY MINUTES - 1ST 30 MINS LEVEL 4: Performed by: ORTHOPAEDIC SURGERY

## 2017-05-08 PROCEDURE — 502240 HCHG MISC OR SUPPLY RC 0272: Performed by: ORTHOPAEDIC SURGERY

## 2017-05-08 PROCEDURE — A6222 GAUZE <=16 IN NO W/SAL W/O B: HCPCS | Performed by: ORTHOPAEDIC SURGERY

## 2017-05-08 PROCEDURE — 160035 HCHG PACU - 1ST 60 MINS PHASE I: Performed by: ORTHOPAEDIC SURGERY

## 2017-05-08 PROCEDURE — 501423 HCHG SPONGE, SURGIFOAM 12X7: Performed by: ORTHOPAEDIC SURGERY

## 2017-05-08 DEVICE — DURASEAL SEALANT SYSTEM 5ML - (5/BX): Type: IMPLANTABLE DEVICE | Site: BACK | Status: FUNCTIONAL

## 2017-05-08 RX ORDER — HYDROMORPHONE HYDROCHLORIDE 2 MG/ML
INJECTION, SOLUTION INTRAMUSCULAR; INTRAVENOUS; SUBCUTANEOUS
Status: COMPLETED
Start: 2017-05-08 | End: 2017-05-08

## 2017-05-08 RX ORDER — MEPERIDINE HYDROCHLORIDE 25 MG/ML
INJECTION INTRAMUSCULAR; INTRAVENOUS; SUBCUTANEOUS
Status: COMPLETED
Start: 2017-05-08 | End: 2017-05-08

## 2017-05-08 RX ORDER — OXYCODONE HCL 5 MG/5 ML
SOLUTION, ORAL ORAL
Status: COMPLETED
Start: 2017-05-08 | End: 2017-05-08

## 2017-05-08 RX ORDER — SODIUM CHLORIDE, SODIUM LACTATE, POTASSIUM CHLORIDE, CALCIUM CHLORIDE 600; 310; 30; 20 MG/100ML; MG/100ML; MG/100ML; MG/100ML
1000 INJECTION, SOLUTION INTRAVENOUS
Status: COMPLETED | OUTPATIENT
Start: 2017-05-08 | End: 2017-05-08

## 2017-05-08 RX ORDER — BUPIVACAINE HYDROCHLORIDE AND EPINEPHRINE 5; 5 MG/ML; UG/ML
INJECTION, SOLUTION EPIDURAL; INTRACAUDAL; PERINEURAL
Status: DISCONTINUED | OUTPATIENT
Start: 2017-05-08 | End: 2017-05-08 | Stop reason: HOSPADM

## 2017-05-08 RX ORDER — LIDOCAINE HYDROCHLORIDE 10 MG/ML
INJECTION, SOLUTION INFILTRATION; PERINEURAL
Status: COMPLETED
Start: 2017-05-08 | End: 2017-05-08

## 2017-05-08 RX ADMIN — OXYCODONE HYDROCHLORIDE: 5 SOLUTION ORAL at 16:31

## 2017-05-08 RX ADMIN — SODIUM CHLORIDE, SODIUM LACTATE, POTASSIUM CHLORIDE, CALCIUM CHLORIDE 1000 ML: 600; 310; 30; 20 INJECTION, SOLUTION INTRAVENOUS at 11:10

## 2017-05-08 RX ADMIN — MEPERIDINE HYDROCHLORIDE 25 MG: 25 INJECTION INTRAMUSCULAR; INTRAVENOUS; SUBCUTANEOUS at 16:15

## 2017-05-08 RX ADMIN — HYDROMORPHONE HYDROCHLORIDE 0.5 MG: 2 INJECTION INTRAMUSCULAR; INTRAVENOUS; SUBCUTANEOUS at 16:54

## 2017-05-08 RX ADMIN — FENTANYL CITRATE 50 MCG: 50 INJECTION, SOLUTION INTRAMUSCULAR; INTRAVENOUS at 16:31

## 2017-05-08 RX ADMIN — OXYCODONE HYDROCHLORIDE 5 MG: 5 SOLUTION ORAL at 16:31

## 2017-05-08 RX ADMIN — LIDOCAINE HYDROCHLORIDE: 10 INJECTION, SOLUTION INFILTRATION; PERINEURAL at 11:10

## 2017-05-08 RX ADMIN — FENTANYL CITRATE 50 MCG: 50 INJECTION, SOLUTION INTRAMUSCULAR; INTRAVENOUS at 16:45

## 2017-05-08 ASSESSMENT — PAIN SCALES - GENERAL
PAINLEVEL_OUTOF10: 7
PAINLEVEL_OUTOF10: 4
PAINLEVEL_OUTOF10: 6
PAINLEVEL_OUTOF10: 6
PAINLEVEL_OUTOF10: 4

## 2017-05-08 NOTE — IP AVS SNAPSHOT
" Home Care Instructions                                                                                                                Name:Gil Mueller  Medical Record Number:[id number]  CSN: [id number]    YOB: 1975   Age: 41 y.o.  Sex: male  HT:1.727 m (5' 7.99\") WT: 118.8 kg (261 lb 14.5 oz)          Admit Date: 5/8/2017     Discharge Date:   Today's Date: 5/8/2017  Attending Doctor:  Jose Benites M.D.                  Allergies:  Review of patient's allergies indicates no known allergies.                Discharge Instructions         ACTIVITY: Rest and take it easy for the first 24 hours.  A responsible adult is recommended to remain with you during that time.  It is normal to feel sleepy.  We encourage you to not do anything that requires balance, judgment or coordination.    MILD FLU-LIKE SYMPTOMS ARE NORMAL. YOU MAY EXPERIENCE GENERALIZED MUSCLE ACHES, THROAT IRRITATION, HEADACHE AND/OR SOME NAUSEA.    FOR 24 HOURS DO NOT:  Drive, operate machinery or run household appliances.  Drink beer or alcoholic beverages.   Make important decisions or sign legal documents.    SPECIAL INSTRUCTIONS:     Microdiskectomy, Care After  Refer to this sheet in the next few weeks. These instructions provide you with information on caring for yourself after your procedure. Your caregiver may also give you more specific instructions. Your treatment has been planned according to current medical practices, but problems sometimes occur. Call your caregiver if you have any problems or questions after your procedure.  HOME CARE INSTRUCTIONS   Pain Relief  · Put ice on the injured area.  ¨ Put ice in a plastic bag.  ¨ Place a towel between your skin and the bag.  ¨ Leave the ice on for about 20 minutes every hour. You may need to do this for several days.  · Only take pain medicine has been prescribed by the surgeon. Follow the directions carefully. Do not take over-the-counter pain medicine unless the surgeon says " it is okay.  · Avoid pain medicines such as aspirin and ibuprofen for several weeks. They increase the chances of bleeding.  · Do not drive if you are taking narcotic pain medicines. You may need to take a stool softener while taking narcotic pain medicine. Also eat foods high in fiber to prevent not being able to have a bowel movement (constipation). Fruits and vegetables are examples.  Wound Care  · Do not get the surgical cut (incision) wet until the surgeon says it is okay.  · Check the area around the incision often. Look for redness, swelling, or anything leaking from the wound.  Activity  · Take it easy for a while. A full recovery can take 4 to 6 weeks.  · Walk as much as possible.  · Do not sit for long periods of time during the first few weeks. Do not pull on things.  · Do not drive until your surgeon says it is okay. Avoid long trips in the car for several weeks.  · Do not lift anything heavier than 10 lb (4.5 kg) until your surgeon says it is safe.  · Ask your caregiver when you can resume other activities, such as work, driving, or sex. Avoid bending, squatting, and crawling.  Exercise  · Exercises are often needed to stretch the muscles in the back and to make the back stronger. Ask your caregiver what you should and should not do. The correct exercises can speed up your recovery. Physical therapy may be needed.  Follow-up Care  · The surgeon may need to take out stitches or staples. This is usually done about 2 weeks after the surgery.  · The surgeon may do X-rays to see how the disk area is healing.  SEEK MEDICAL CARE IF:   · You have any questions about medicines.  · Your pain continues, even after taking pain medicine.  · You feel sick to your stomach (nauseous).  · You are constipated.  SEEK IMMEDIATE MEDICAL CARE IF:   · Your pain suddenly becomes much worse, particularly if your leg pain increases.  · Your incision area is red, swollen, or bleeding.  · You have fluid leaking from the  incision.  · Your legs or feet become painful or swollen.  · You have trouble breathing.  · You have difficulty controlling urination or bowel movements.  · You have chest pain.  · You have a fever.  MAKE SURE YOU:  · Understand these instructions.  · Will watch your condition.  · Will get help right away if you are not doing well or get worse.     This information is not intended to replace advice given to you by your health care provider. Make sure you discuss any questions you have with your health care provider.     Document Released: 04/03/2012 Document Revised: 10/08/2014 Document Reviewed: 04/03/2012  [employer] Interactive Patient Education ©2016 [employer] Inc.      DIET: To avoid nausea, slowly advance diet as tolerated, avoiding spicy or greasy foods for the first day.  Add more substantial food to your diet according to your physician's instructions. INCREASE FLUIDS AND FIBER TO AVOID CONSTIPATION.    SURGICAL DRESSING/BATHING: Follow surgeon instructions    FOLLOW-UP APPOINTMENT:  A follow-up appointment should be arranged with your doctor in 1-2 weeks; call to schedule.    You should CALL YOUR PHYSICIAN if you develop:  Fever greater than 101 degrees F.  Pain not relieved by medication, or persistent nausea or vomiting.  Excessive bleeding (blood soaking through dressing) or unexpected drainage from the wound.  Extreme redness or swelling around the incision site, drainage of pus or foul smelling drainage.  Inability to urinate or empty your bladder within 8 hours.  Problems with breathing or chest pain.    You should call 911 if you develop problems with breathing or chest pain.  If you are unable to contact your doctor or surgical center, you should go to the nearest emergency room or urgent care center.  Physician's telephone #: [phone]    If any questions arise, call your doctor.  If your doctor is not available, please feel free to call the Surgical Center at [phone].  The Center is open  Monday through Friday from 7AM to 7PM.  You can also call the HEALTH HOTLINE open 24 hours/day, 7 days/week and speak to a nurse at [phone], or toll free at [phone].    A registered nurse may call you a few days after your surgery to see how you are doing after your procedure.    MEDICATIONS: Resume taking daily medication.  Take prescribed pain medication with food.  If no medication is prescribed, you may take non-aspirin pain medication if needed.  PAIN MEDICATION CAN BE VERY CONSTIPATING.  Take a stool softener or laxative such as senokot, pericolace, or milk of magnesia if needed.    Prescription given for Oxy, celebrex.  Last pain medication given at 4:30pm.    If your physician has prescribed pain medication that includes Acetaminophen (Tylenol), do not take additional Acetaminophen (Tylenol) while taking the prescribed medication.    Depression / Suicide Risk    As you are discharged from this [facility] facility, it is important to learn how to keep safe from harming yourself.    Recognize the warning signs:  · Abrupt changes in personality, positive or negative- including increase in energy   · Giving away possessions  · Change in eating patterns- significant weight changes-  positive or negative  · Change in sleeping patterns- unable to sleep or sleeping all the time   · Unwillingness or inability to communicate  · Depression  · Unusual sadness, discouragement and loneliness  · Talk of wanting to die  · Neglect of personal appearance   · Rebelliousness- reckless behavior  · Withdrawal from people/activities they love  · Confusion- inability to concentrate     If you or a loved one observes any of these behaviors or has concerns about self-harm, here's what you can do:  · Talk about it- your feelings and reasons for harming yourself  · Remove any means that you might use to hurt yourself (examples: pills, rope, extension cords, firearm)  · Get professional help from the community (Mental  Health, Substance Abuse, psychological counseling)  · Do not be alone:Call your Safe Contact- someone whom you trust who will be there for you.  · Call your local CRISIS HOTLINE 331-4584 or 402-701-7589  · Call your local Children's Mobile Crisis Response Team [place] (419) 807-1578 or www.Buzzoo  · Call the toll free National Suicide Prevention Hotlines   · National Suicide Prevention Lifeline 460-728-TESZ (1477)  · National Hope Line Network 800-SUICIDE (255-0720)       Medication List      ASK your doctor about these medications        Instructions    Morning Afternoon Evening Bedtime    gabapentin 600 MG tablet   Commonly known as:  NEURONTIN        Take 1,500 mg by mouth 3 times a day.   Dose:  1500 mg                        METAMUCIL PO        Take  by mouth every day.                                Medication Information     Above and/or attached are the medications your physician expects you to take upon discharge. Review all of your home medications and newly ordered medications with your doctor and/or pharmacist. Follow medication instructions as directed by your doctor and/or pharmacist. Please keep your medication list with you and share with your physician. Update the information when medications are discontinued, doses are changed, or new medications (including over-the-counter products) are added; and carry medication information at all times in the event of emergency situations.        Resources     Quit Smoking / Tobacco Use:    I understand the use of any tobacco products increases my chance of suffering from future heart disease or stroke and could cause other illnesses which may shorten my life. Quitting the use of tobacco products is the single most important thing I can do to improve my health. For further information on smoking / tobacco cessation call a Toll Free Quit Line at 1-771.261.1852 (*[employer] [place]) or 1-538.965.6231 (American Lung Association) or you can  access the web based program at www.[employer]Mobile Broad[employer] Network.org.    [place] Tobacco Users Help Line:  (674) 291-8926       Toll Free: 1-113.709.5539  Quit Tobacco Program [facility] (082)830-0044    Crisis Hotline:    [place] Crisis Hotline:  6-514-HPDLHIT or 1-204.858.8461    [place] Crisis Hotline:    1-126.891.9085 or 955-668-2868    Discharge Survey:   Thank you for choosing [facility]. We hope we did everything we could to make your hospital stay a pleasant one. You may be receiving a survey and we would appreciate your time and participation in answering the questions. Your input is very valuable to us in our efforts to improve our service to our patients and their families.            Signatures     My signature on this form indicates that:    1. I acknowledge receipt and understanding of these Home Care Instruction.  2. My questions regarding this information have been answered to my satisfaction.  3. I have formulated a plan with my discharge nurse to obtain my prescribed medications for home.    __________________________________      __________________________________                   Patient Signature                                 Guardian/Responsible Adult Signature      __________________________________                 __________       ________                       Nurse Signature                                               Date                 Time

## 2017-05-08 NOTE — IP AVS SNAPSHOT
5/8/2017    Gil Mueller  [address] [place] [address]    Dear Gil:    [facility] wants to ensure your discharge home is safe and you or your loved ones have had all of your questions answered regarding your care after you leave the hospital.    Below is a list of resources and contact information should you have any questions regarding your hospital stay, follow-up instructions, or active medical symptoms.    Questions or Concerns Regarding… Contact   Medical Questions Related to Your Discharge  (7 days a week, 8am-5pm) Contact a Nurse Care Coordinator   [phone]   Medical Questions Not Related to Your Discharge  (24 hours a day / 7 days a week)  Contact the Nurse Health Line   [phone]    Medications or Discharge Instructions Refer to your discharge packet   or contact your [facility] Primary Care Provider   [phone]   Follow-up Appointment(s) Schedule your appointment via [employer]   or contact Scheduling [phone]   Billing Review your statement via [employer]  or contact Billing [phone]   Medical Records Review your records via [employer]   or contact Medical Records [phone]     You may receive a telephone call within two days of discharge. This call is to make certain you understand your discharge instructions and have the opportunity to have any questions answered. You can also easily access your medical information, test results and upcoming appointments via the [employer] free online health management tool. You can learn more and sign up at [employer]. For assistance setting up your [employer] account, please call [phone].    Once again, we want to ensure your discharge home is safe and that you have a clear understanding of any next steps in your care. If you have any questions or concerns, please do not hesitate to contact us, we are here for you. Thank you for choosing [facility] for your healthcare needs.    Sincerely,    Your [facility] Healthcare Team

## 2017-05-09 NOTE — DISCHARGE INSTRUCTIONS
ACTIVITY: Rest and take it easy for the first 24 hours.  A responsible adult is recommended to remain with you during that time.  It is normal to feel sleepy.  We encourage you to not do anything that requires balance, judgment or coordination.    MILD FLU-LIKE SYMPTOMS ARE NORMAL. YOU MAY EXPERIENCE GENERALIZED MUSCLE ACHES, THROAT IRRITATION, HEADACHE AND/OR SOME NAUSEA.    FOR 24 HOURS DO NOT:  Drive, operate machinery or run household appliances.  Drink beer or alcoholic beverages.   Make important decisions or sign legal documents.    SPECIAL INSTRUCTIONS:     Microdiskectomy, Care After  Refer to this sheet in the next few weeks. These instructions provide you with information on caring for yourself after your procedure. Your caregiver may also give you more specific instructions. Your treatment has been planned according to current medical practices, but problems sometimes occur. Call your caregiver if you have any problems or questions after your procedure.  HOME CARE INSTRUCTIONS   Pain Relief  · Put ice on the injured area.  ¨ Put ice in a plastic bag.  ¨ Place a towel between your skin and the bag.  ¨ Leave the ice on for about 20 minutes every hour. You may need to do this for several days.  · Only take pain medicine has been prescribed by the surgeon. Follow the directions carefully. Do not take over-the-counter pain medicine unless the surgeon says it is okay.  · Avoid pain medicines such as aspirin and ibuprofen for several weeks. They increase the chances of bleeding.  · Do not drive if you are taking narcotic pain medicines. You may need to take a stool softener while taking narcotic pain medicine. Also eat foods high in fiber to prevent not being able to have a bowel movement (constipation). Fruits and vegetables are examples.  Wound Care  · Do not get the surgical cut (incision) wet until the surgeon says it is okay.  · Check the area around the incision often. Look for redness, swelling, or  anything leaking from the wound.  Activity  · Take it easy for a while. A full recovery can take 4 to 6 weeks.  · Walk as much as possible.  · Do not sit for long periods of time during the first few weeks. Do not pull on things.  · Do not drive until your surgeon says it is okay. Avoid long trips in the car for several weeks.  · Do not lift anything heavier than 10 lb (4.5 kg) until your surgeon says it is safe.  · Ask your caregiver when you can resume other activities, such as work, driving, or sex. Avoid bending, squatting, and crawling.  Exercise  · Exercises are often needed to stretch the muscles in the back and to make the back stronger. Ask your caregiver what you should and should not do. The correct exercises can speed up your recovery. Physical therapy may be needed.  Follow-up Care  · The surgeon may need to take out stitches or staples. This is usually done about 2 weeks after the surgery.  · The surgeon may do X-rays to see how the disk area is healing.  SEEK MEDICAL CARE IF:   · You have any questions about medicines.  · Your pain continues, even after taking pain medicine.  · You feel sick to your stomach (nauseous).  · You are constipated.  SEEK IMMEDIATE MEDICAL CARE IF:   · Your pain suddenly becomes much worse, particularly if your leg pain increases.  · Your incision area is red, swollen, or bleeding.  · You have fluid leaking from the incision.  · Your legs or feet become painful or swollen.  · You have trouble breathing.  · You have difficulty controlling urination or bowel movements.  · You have chest pain.  · You have a fever.  MAKE SURE YOU:  · Understand these instructions.  · Will watch your condition.  · Will get help right away if you are not doing well or get worse.     This information is not intended to replace advice given to you by your health care provider. Make sure you discuss any questions you have with your health care provider.     Document Released: 04/03/2012 Document  Revised: 10/08/2014 Document Reviewed: 04/03/2012  [email or web address] Interactive Patient Education ©2016 [employer]vier Inc.      DIET: To avoid nausea, slowly advance diet as tolerated, avoiding spicy or greasy foods for the first day.  Add more substantial food to your diet according to your physician's instructions. INCREASE FLUIDS AND FIBER TO AVOID CONSTIPATION.    SURGICAL DRESSING/BATHING: Follow surgeon instructions    FOLLOW-UP APPOINTMENT:  A follow-up appointment should be arranged with your doctor in 1-2 weeks; call to schedule.    You should CALL YOUR PHYSICIAN if you develop:  Fever greater than 101 degrees F.  Pain not relieved by medication, or persistent nausea or vomiting.  Excessive bleeding (blood soaking through dressing) or unexpected drainage from the wound.  Extreme redness or swelling around the incision site, drainage of pus or foul smelling drainage.  Inability to urinate or empty your bladder within 8 hours.  Problems with breathing or chest pain.    You should call 911 if you develop problems with breathing or chest pain.  If you are unable to contact your doctor or surgical center, you should go to the nearest emergency room or urgent care center.  Physician's telephone #: [phone]    If any questions arise, call your doctor.  If your doctor is not available, please feel free to call the Surgical Center at [phone].  The Center is open Monday through Friday from 7AM to 7PM.  You can also call the [place] HOTLINE open 24 hours/day, 7 days/week and speak to a nurse at [phone], or toll free at [phone].    A registered nurse may call you a few days after your surgery to see how you are doing after your procedure.    MEDICATIONS: Resume taking daily medication.  Take prescribed pain medication with food.  If no medication is prescribed, you may take non-aspirin pain medication if needed.  PAIN MEDICATION CAN BE VERY CONSTIPATING.  Take a stool softener or laxative such as senokot,  pericolace, or milk of magnesia if needed.    Prescription given for Oxy, celebrex.  Last pain medication given at 4:30pm.    If your physician has prescribed pain medication that includes Acetaminophen (Tylenol), do not take additional Acetaminophen (Tylenol) while taking the prescribed medication.    Depression / Suicide Risk    As you are discharged from this [facility] facility, it is important to learn how to keep safe from harming yourself.    Recognize the warning signs:  · Abrupt changes in personality, positive or negative- including increase in energy   · Giving away possessions  · Change in eating patterns- significant weight changes-  positive or negative  · Change in sleeping patterns- unable to sleep or sleeping all the time   · Unwillingness or inability to communicate  · Depression  · Unusual sadness, discouragement and loneliness  · Talk of wanting to die  · Neglect of personal appearance   · Rebelliousness- reckless behavior  · Withdrawal from people/activities they love  · Confusion- inability to concentrate     If you or a loved one observes any of these behaviors or has concerns about self-harm, here's what you can do:  · Talk about it- your feelings and reasons for harming yourself  · Remove any means that you might use to hurt yourself (examples: pills, rope, extension cords, firearm)  · Get professional help from the community (Mental Health, Substance Abuse, psychological counseling)  · Do not be alone:Call your Safe Contact- someone whom you trust who will be there for you.  · Call your local CRISIS HOTLINE 883-8803 or 994-591-6316  · Call your local Children's Mobile Crisis Response Team [place] (895) 184-3876 or www.interclick  · Call the toll free National Suicide Prevention Hotlines   · National Suicide Prevention Lifeline 423-791-YWPB (3014)  · National Hope Line Network 800-SUICIDE (311-1991)

## 2017-05-15 ENCOUNTER — APPOINTMENT (OUTPATIENT)
Dept: HEMATOLOGY ONCOLOGY | Facility: MEDICAL CENTER | Age: 42
End: 2017-05-15
Payer: MEDICAID

## 2017-06-20 ENCOUNTER — OFFICE VISIT (OUTPATIENT)
Dept: MEDICAL GROUP | Facility: MEDICAL CENTER | Age: 42
End: 2017-06-20
Attending: FAMILY MEDICINE
Payer: MEDICAID

## 2017-06-20 VITALS
SYSTOLIC BLOOD PRESSURE: 118 MMHG | HEIGHT: 69 IN | WEIGHT: 260 LBS | HEART RATE: 80 BPM | OXYGEN SATURATION: 95 % | BODY MASS INDEX: 38.51 KG/M2 | DIASTOLIC BLOOD PRESSURE: 72 MMHG | TEMPERATURE: 99.5 F | RESPIRATION RATE: 16 BRPM

## 2017-06-20 DIAGNOSIS — M51.27 LUMBAGO-SCIATICA DUE TO DISPLACEMENT OF LUMBAR INTERVERTEBRAL DISC: ICD-10-CM

## 2017-06-20 DIAGNOSIS — R45.4 IRRITABILITY AND ANGER: ICD-10-CM

## 2017-06-20 PROCEDURE — 99213 OFFICE O/P EST LOW 20 MIN: CPT | Performed by: FAMILY MEDICINE

## 2017-06-20 RX ORDER — DULOXETIN HYDROCHLORIDE 60 MG/1
60 CAPSULE, DELAYED RELEASE ORAL DAILY
Qty: 30 CAP | Refills: 6 | Status: SHIPPED | OUTPATIENT
Start: 2017-06-20 | End: 2020-02-05

## 2017-06-20 ASSESSMENT — ENCOUNTER SYMPTOMS: BACK PAIN: 1

## 2017-06-20 ASSESSMENT — PAIN SCALES - GENERAL: PAINLEVEL: 9=SEVERE PAIN

## 2017-06-20 NOTE — PROGRESS NOTES
"Subjective:      Gil Mueller is a 42 y.o. male who presents with Back Pain            Back Pain     1. Chronic lumbar pain/bilateral sciatica-patient underwent his second low back surgery on 5/8/17 with Dr. Benites. He reports immediately postoperatively numbness and pain in his left leg had resolved. He reports over the past several weeks he has had recurrence of numbness in his left thigh along with persistent numbness in the right thigh. He is not having leg pain but does note pain at the SI areas bilaterally posteriorly. He still continues to have markedly reduced low back mobility. Patient had discontinued his oxycodone in late April after our discussion about concerns over long-term use of opioids. He reports after surgery he only took Celebrex and no opiates. Patient expresses increasing levels of frustration and anger and irritability at times growing out of his back problem stemming from a work injury in [place] in December 2015.    Review of Systems   Musculoskeletal: Positive for back pain.     Negative for urinary incontinence, fecal incontinence, abdominal bloating     Objective:     /72 mmHg  Pulse 80  Temp(Src) 37.5 °C (99.5 °F)  Resp 16  Ht 1.753 m (5' 9.02\")  Wt 117.935 kg (260 lb)  BMI 38.38 kg/m2  SpO2 95%     Physical Exam   Gen.-alert cooperative male in no acute distress  Back-1+ tender on palpation over both SI joints  Psych-normal affect with good eye contact. Normal grooming. Oriented x4.            Assessment/Plan:     1. Lumbago-sciatica due to displacement of lumbar intervertebral disc      2. Irritability and anger      Plan: 1. Patient is encouraged to continue to communicate clearly with regard to residual numbness and pain he is experiencing symmetrically when talking to his surgeon, Dr. Benites  2. Trial of Cymbalta 60 mg by mouth daily to assist with depression and irritability and anger associated with ongoing loss of employment and chronic pain dating " back to injury in December 2015  3. Restart Celebrex 200 mg (available through his Workmen's Comp.)  4. Psychology referral  5. Revisit with me one month

## 2017-06-20 NOTE — MR AVS SNAPSHOT
"        Gil Mueller   2017 11:30 AM   Office Visit   MRN: [id number]    Department:  [facility]   Dept Phone:  [phone]    Description:  Male : 1975   Provider:  Steven Limon M.D.           Reason for Visit     Back Pain           Allergies as of 2017     No Known Allergies      You were diagnosed with     Lumbago-sciatica due to displacement of lumbar intervertebral disc   [481612]       Irritability and anger   [900672]         Vital Signs     Blood Pressure Pulse Temperature Respirations Height Weight    118/72 mmHg 80 37.5 °C (99.5 °F) 16 1.753 m (5' 9.02\") 117.935 kg (260 lb)    Body Mass Index Oxygen Saturation Smoking Status             38.38 kg/m2 95% Former Smoker         Basic Information     Date Of Birth Sex Race Ethnicity Preferred Language    1975 Male White Non- English      Your appointments     2017 11:30 AM   Established Patient with Steven Limon M.D.   [facility] ([facility])    [address] [place] [id number] [phone]           You will be receiving a confirmation call a few days before your appointment from our automated call confirmation system.            2017  2:40 PM   Hematology New Patient with Davy Macias M.D.   [facility] (--)    [address] [place] [address], [address]  [facility] [id number] [phone]            2017 11:30 AM   Established Patient with Steven Limon M.D.   [facility])    [address] [place] [id number] [phone]           You will be receiving a confirmation call a few days before your appointment from our automated call confirmation system.              Problem List              ICD-10-CM Priority Class Noted - Resolved    Lumbar herniated disc M51.26   Unknown - Present    Acute pain of right knee M25.561   2015 - Present    Morbid obesity (CMS-HCC) E66.01   2015 - Present    HTN (hypertension) I10   2015 " - Present    Lumbar radiculopathy M54.16   7/26/2016 - Present    Splenomegaly R16.1   4/24/2017 - Present    Obesity (BMI 30-39.9) E66.9   4/24/2017 - Present    Lumbago-sciatica due to displacement of lumbar intervertebral disc M51.27   5/8/2017 - Present      Health Maintenance        Date Due Completion Dates    IMM DTaP/Tdap/Td Vaccine (1 - Tdap) 5/21/1993 ---            Current Immunizations     No immunizations on file.      Below and/or attached are the medications your provider expects you to take. Review all of your home medications and newly ordered medications with your provider and/or pharmacist. Follow medication instructions as directed by your provider and/or pharmacist. Please keep your medication list with you and share with your provider. Update the information when medications are discontinued, doses are changed, or new medications (including over-the-counter products) are added; and carry medication information at all times in the event of emergency situations     Allergies:  No Known Allergies          Medications  Valid as of: June 20, 2017 - 11:23 AM    Generic Name Brand Name Tablet Size Instructions for use    DULoxetine HCl (Cap DR Particles) CYMBALTA 60 MG Take 1 Cap by mouth every day.        Gabapentin (Tab) NEURONTIN 600 MG Take 1,500 mg by mouth 3 times a day.        Psyllium   Take  by mouth every day.        .                 Medicines prescribed today were sent to:     [id number] - [place] - [address]    [address] [place] [address]    Phone: [phone] Fax: [phone]    Open 24 Hours?: No      Medication refill instructions:       If your prescription bottle indicates you have medication refills left, it is not necessary to call your provider’s office. Please contact your pharmacy and they will refill your medication.    If your prescription bottle indicates you do not have any refills left, you may request refills at any time through one of the  following ways: The online [employer] system (except Urgent Care), by calling your provider’s office, or by asking your pharmacy to contact your provider’s office with a refill request. Medication refills are processed only during regular business hours and may not be available until the next business day. Your provider may request additional information or to have a follow-up visit with you prior to refilling your medication.   *Please Note: Medication refills are assigned a new Rx number when refilled electronically. Your pharmacy may indicate that no refills were authorized even though a new prescription for the same medication is available at the pharmacy. Please request the medicine by name with the pharmacy before contacting your provider for a refill.        Referral     A referral request has been sent to our patient care coordination department. Please allow 3-5 business days for us to process this request and contact you either by phone or mail. If you do not hear from us by the 5th business day, please call us at [phone].        Other Notes About Your Plan     Fall Risk Done 07/31/2015           [employer]ogether [employer] Access Code: Activation code not generated  Current Together Mobile Status: Active          Quit Tobacco Information     Do you want to quit using tobacco?    Quitting tobacco decreases risks of cancer, heart and lung disease, increases life expectancy, improves sense of taste and smell, and increases spending money, among other benefits.    If you are thinking about quitting, we can help.  • [facility] Quit Tobacco Program: 404.300.3393  o Program occurs weekly for four weeks and includes pharmacist consultation on products to support quitting smoking or chewing tobacco. A provider referral is needed for pharmacist consultation.  • Tobacco Users Help Hotline: 2-800-QUIT-NOW (136-2986) or https://[place].quitlogix.org/  o Free, confidential telephone and online coaching for [place] residents. Sessions are designed  on a schedule that is convenient for you. Eligible clients receive free nicotine replacement therapy.  • Nationally: www.smokefree.gov  o Information and professional assistance to support both immediate and long-term needs as you become, and remain, a non-smoker. Smokefree.gov allows you to choose the help that best fits your needs.

## 2017-07-17 ENCOUNTER — TELEPHONE (OUTPATIENT)
Dept: HEMATOLOGY ONCOLOGY | Facility: MEDICAL CENTER | Age: 42
End: 2017-07-17

## 2017-07-17 ENCOUNTER — APPOINTMENT (OUTPATIENT)
Dept: HEMATOLOGY ONCOLOGY | Facility: MEDICAL CENTER | Age: 42
End: 2017-07-17
Payer: MEDICAID

## 2017-07-17 NOTE — TELEPHONE ENCOUNTER
1st attempt    Left message for patient to call back regarding his missed new hematology appointment today.     Ref: Steven Limon M.D., Dx: Splenomegaly

## 2017-08-16 ENCOUNTER — TELEPHONE (OUTPATIENT)
Dept: HEMATOLOGY ONCOLOGY | Facility: MEDICAL CENTER | Age: 42
End: 2017-08-16

## 2017-08-16 NOTE — TELEPHONE ENCOUNTER
Called and spoke to pt about missed appointment on 7/17/17 pt states that they do need an appointment at this time that they will be getting a new referral for MRI and if needed they will call and schedule an appointment.

## 2019-10-31 ENCOUNTER — IMMUNIZATION (OUTPATIENT)
Dept: SOCIAL WORK | Facility: CLINIC | Age: 44
End: 2019-10-31
Payer: COMMERCIAL

## 2019-10-31 DIAGNOSIS — Z23 NEED FOR VACCINATION: ICD-10-CM

## 2019-10-31 PROCEDURE — 90686 IIV4 VACC NO PRSV 0.5 ML IM: CPT | Performed by: REGISTERED NURSE

## 2019-10-31 PROCEDURE — 90471 IMMUNIZATION ADMIN: CPT | Performed by: REGISTERED NURSE

## 2020-02-05 ENCOUNTER — APPOINTMENT (OUTPATIENT)
Dept: RADIOLOGY | Facility: MEDICAL CENTER | Age: 45
End: 2020-02-05
Attending: EMERGENCY MEDICINE
Payer: COMMERCIAL

## 2020-02-05 ENCOUNTER — HOSPITAL ENCOUNTER (EMERGENCY)
Facility: MEDICAL CENTER | Age: 45
End: 2020-02-05
Attending: EMERGENCY MEDICINE
Payer: COMMERCIAL

## 2020-02-05 VITALS
WEIGHT: 281.09 LBS | SYSTOLIC BLOOD PRESSURE: 116 MMHG | OXYGEN SATURATION: 95 % | DIASTOLIC BLOOD PRESSURE: 77 MMHG | RESPIRATION RATE: 18 BRPM | BODY MASS INDEX: 42.6 KG/M2 | TEMPERATURE: 97.3 F | HEART RATE: 75 BPM | HEIGHT: 68 IN

## 2020-02-05 DIAGNOSIS — R55 NEAR SYNCOPE: ICD-10-CM

## 2020-02-05 LAB
ALBUMIN SERPL BCP-MCNC: 4.4 G/DL (ref 3.2–4.9)
ALBUMIN/GLOB SERPL: 1.4 G/DL
ALP SERPL-CCNC: 74 U/L (ref 30–99)
ALT SERPL-CCNC: 122 U/L (ref 2–50)
ANION GAP SERPL CALC-SCNC: 9 MMOL/L (ref 0–11.9)
AST SERPL-CCNC: 73 U/L (ref 12–45)
BASOPHILS # BLD AUTO: 0.9 % (ref 0–1.8)
BASOPHILS # BLD: 0.07 K/UL (ref 0–0.12)
BILIRUB SERPL-MCNC: 1 MG/DL (ref 0.1–1.5)
BUN SERPL-MCNC: 14 MG/DL (ref 8–22)
CALCIUM SERPL-MCNC: 9.5 MG/DL (ref 8.5–10.5)
CHLORIDE SERPL-SCNC: 106 MMOL/L (ref 96–112)
CO2 SERPL-SCNC: 26 MMOL/L (ref 20–33)
CREAT SERPL-MCNC: 0.84 MG/DL (ref 0.5–1.4)
EKG IMPRESSION: NORMAL
EOSINOPHIL # BLD AUTO: 0 K/UL (ref 0–0.51)
EOSINOPHIL NFR BLD: 0 % (ref 0–6.9)
ERYTHROCYTE [DISTWIDTH] IN BLOOD BY AUTOMATED COUNT: 45.1 FL (ref 35.9–50)
GLOBULIN SER CALC-MCNC: 3.2 G/DL (ref 1.9–3.5)
GLUCOSE SERPL-MCNC: 109 MG/DL (ref 65–99)
HCT VFR BLD AUTO: 49.7 % (ref 42–52)
HGB BLD-MCNC: 17.2 G/DL (ref 14–18)
LYMPHOCYTES # BLD AUTO: 1.49 K/UL (ref 1–4.8)
LYMPHOCYTES NFR BLD: 19.1 % (ref 22–41)
MANUAL DIFF BLD: NORMAL
MCH RBC QN AUTO: 33.5 PG (ref 27–33)
MCHC RBC AUTO-ENTMCNC: 34.6 G/DL (ref 33.7–35.3)
MCV RBC AUTO: 96.7 FL (ref 81.4–97.8)
MONOCYTES # BLD AUTO: 0.2 K/UL (ref 0–0.85)
MONOCYTES NFR BLD AUTO: 2.6 % (ref 0–13.4)
MORPHOLOGY BLD-IMP: NORMAL
NEUTROPHILS # BLD AUTO: 6.04 K/UL (ref 1.82–7.42)
NEUTROPHILS NFR BLD: 77.4 % (ref 44–72)
PLATELET # BLD AUTO: 167 K/UL (ref 164–446)
PLATELET BLD QL SMEAR: NORMAL
PMV BLD AUTO: 12.9 FL (ref 9–12.9)
POTASSIUM SERPL-SCNC: 3.9 MMOL/L (ref 3.6–5.5)
PROT SERPL-MCNC: 7.6 G/DL (ref 6–8.2)
RBC # BLD AUTO: 5.14 M/UL (ref 4.7–6.1)
RBC BLD AUTO: NORMAL
SODIUM SERPL-SCNC: 141 MMOL/L (ref 135–145)
TROPONIN T SERPL-MCNC: 11 NG/L (ref 6–19)
WBC # BLD AUTO: 8.1 K/UL (ref 4.8–10.8)

## 2020-02-05 PROCEDURE — 85027 COMPLETE CBC AUTOMATED: CPT

## 2020-02-05 PROCEDURE — 71045 X-RAY EXAM CHEST 1 VIEW: CPT

## 2020-02-05 PROCEDURE — 85007 BL SMEAR W/DIFF WBC COUNT: CPT

## 2020-02-05 PROCEDURE — 99284 EMERGENCY DEPT VISIT MOD MDM: CPT

## 2020-02-05 PROCEDURE — 93005 ELECTROCARDIOGRAM TRACING: CPT | Performed by: EMERGENCY MEDICINE

## 2020-02-05 PROCEDURE — 93005 ELECTROCARDIOGRAM TRACING: CPT

## 2020-02-05 PROCEDURE — 84484 ASSAY OF TROPONIN QUANT: CPT

## 2020-02-05 PROCEDURE — 700105 HCHG RX REV CODE 258: Performed by: EMERGENCY MEDICINE

## 2020-02-05 PROCEDURE — 80053 COMPREHEN METABOLIC PANEL: CPT

## 2020-02-05 RX ORDER — SODIUM CHLORIDE 9 MG/ML
1000 INJECTION, SOLUTION INTRAVENOUS ONCE
Status: DISCONTINUED | OUTPATIENT
Start: 2020-02-05 | End: 2020-02-05

## 2020-02-05 RX ORDER — SODIUM CHLORIDE 9 MG/ML
1000 INJECTION, SOLUTION INTRAVENOUS ONCE
Status: COMPLETED | OUTPATIENT
Start: 2020-02-05 | End: 2020-02-05

## 2020-02-05 RX ADMIN — SODIUM CHLORIDE 1000 ML: 9 INJECTION, SOLUTION INTRAVENOUS at 10:24

## 2020-02-05 NOTE — ED NOTES
PT VU dcis with strict return precautions and follow up w/pcp. Pt left A&OX4 ambulatory with steady gait to discharge. Pt left alone.

## 2020-02-05 NOTE — ED TRIAGE NOTES
"Chief Complaint   Patient presents with   • Near Syncopal     blurred vision \"wobby\" pt had near sycopal episode walking at at work. pt sat on chair and had something to eat thinking he was hungry. Pt states the feeling is when he is standing but resolves while sitting        "

## 2020-02-05 NOTE — ED NOTES
Medication Reconciliation updated and complete per pt at bedside  Allergies reviewed           Pt reports NO Prescription or OTC medications

## 2020-02-05 NOTE — ED PROVIDER NOTES
"ED Provider Note    CHIEF COMPLAINT  Chief Complaint   Patient presents with   • Near Syncopal     blurred vision \"wobby\" pt had near sycopal episode walking at at work. pt sat on chair and had something to eat thinking he was hungry. Pt states the feeling is when he is standing but resolves while sitting        HPI  Gil Mueller is a 44 y.o. male who presents for evaluation of an episode where he felt lightheaded and dizzy when he went from sitting in a chair to standing.  He felt slightly nauseous but did not have any palpitations no chest pain.  He did not actually lose consciousness.  When he sat down he felt much better.  Patient is an otherwise healthy gentleman with some various orthopedic ailments and surgeries but no active medical issues.  He has never passed out before he has never had dyspnea on exertion or chest pain.  No high fevers leg swelling or hemoptysis.  No other symptoms reported    REVIEW OF SYSTEMS  See HPI for further details.  No night sweats weight loss numbness tingling weakness rash all other systems are negative.     PAST MEDICAL HISTORY  Past Medical History:   Diagnosis Date   • Anesthesia     aggressive wake up    • Lumbar herniated disc    • Pain        FAMILY HISTORY  No history of sudden cardiac death    SOCIAL HISTORY  Social History     Socioeconomic History   • Marital status:      Spouse name: Not on file   • Number of children: Not on file   • Years of education: Not on file   • Highest education level: Not on file   Occupational History   • Not on file   Social Needs   • Financial resource strain: Not on file   • Food insecurity:     Worry: Not on file     Inability: Not on file   • Transportation needs:     Medical: Not on file     Non-medical: Not on file   Tobacco Use   • Smoking status: Former Smoker     Types: Cigarettes     Last attempt to quit: 3/12/2004     Years since quitting: 15.9   • Smokeless tobacco: Current User     Types: Chew   Substance and " Sexual Activity   • Alcohol use: Yes     Alcohol/week: 0.0 oz     Comment: socially   • Drug use: No     Comment: DC'd  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 daily    • Sexual activity: Yes     Partners: Female     Comment: ,    Lifestyle   • Physical activity:     Days per week: Not on file     Minutes per session: Not on file   • Stress: Not on file   Relationships   • Social connections:     Talks on phone: Not on file     Gets together: Not on file     Attends Yazdanism service: Not on file     Active member of club or organization: Not on file     Attends meetings of clubs or organizations: Not on file     Relationship status: Not on file   • Intimate partner violence:     Fear of current or ex partner: Not on file     Emotionally abused: Not on file     Physically abused: Not on file     Forced sexual activity: Not on file   Other Topics Concern   • Not on file   Social History Narrative   • Not on file     Chews tobacco occasional alcohol  SURGICAL HISTORY  Past Surgical History:   Procedure Laterality Date   • LUMBAR LAMINECTOMY DISKECTOMY Left 5/8/2017    Procedure: LUMBAR LAMINECTOMY DISKECTOMY L5-S1 MICRODISC;  Surgeon: Jose Benites M.D.;  Location: SURGERY [facility];  Service:    • LUMBAR FUSION POSTERIOR Right 7/26/2016    Procedure: LUMBAR FUSION POSTERIOR For: Coflex Stabilization L4-5;  Surgeon: Jose Benites M.D.;  Location: SURGERY [facility];  Service:    • LUMBAR LAMINECTOMY DISKECTOMY Right 7/26/2016    Procedure: LUMBAR LAMINECTOMY DISKECTOMY L4-5 Lami;  Surgeon: Jose Benites M.D.;  Location: SURGERY [facility];  Service:    • KNEE ARTHROSCOPY Left 2013    medial meniscus tear   • HERNIA REPAIR Left age 13   • OTHER ORTHOPEDIC SURGERY  age 27    L4-L5 discectomy       CURRENT MEDICATIONS  No no regular meds  ALLERGIES  No Known Allergies    PHYSICAL EXAM  VITAL SIGNS: /96   Pulse 95   Temp 36.2 °C (97.2 °F) (Temporal)   Resp 18   Ht 1.727 m (5'  "8\")   Wt (!) 127.5 kg (281 lb 1.4 oz)   SpO2 98%   BMI 42.74 kg/m²       Constitutional: Well developed, Well nourished, No acute distress, Non-toxic appearance.   HENT: Normocephalic, Atraumatic, Bilateral external ears normal, Oropharynx moist, No oral exudates, Nose normal.   Eyes: PERRLA, EOMI, Conjunctiva normal, No discharge.   Neck: Normal range of motion, No tenderness, Supple, No stridor.   Lymphatic: No lymphadenopathy noted.   Cardiovascular: Normal heart rate, Normal rhythm, No murmurs, No rubs, No gallops.   Thorax & Lungs: Normal breath sounds, No respiratory distress, No wheezing, No chest tenderness.   Abdomen: Bowel sounds normal, Soft, No tenderness, No masses, No pulsatile masses.   Skin: Warm, Dry, No erythema, No rash.   Back: No tenderness, No CVA tenderness.   Extremities: Intact distal pulses, No edema, No tenderness, No cyanosis, No clubbing.   Musculoskeletal: Good range of motion in all major joints. No tenderness to palpation or major deformities noted.   Neurologic: Alert & oriented x 3, Normal motor function, Normal sensory function, No focal deficits noted.  Annual nerves II through XII grossly intact no ataxia NIH stroke scale score of 0  Psychiatric: Affect normal, Judgment normal, Mood normal.       RADIOLOGY/PROCEDURES  Results for orders placed or performed during the hospital encounter of 02/05/20   CBC WITH DIFFERENTIAL   Result Value Ref Range    WBC 8.1 4.8 - 10.8 K/uL    RBC 5.14 4.70 - 6.10 M/uL    Hemoglobin 17.2 14.0 - 18.0 g/dL    Hematocrit 49.7 42.0 - 52.0 %    MCV 96.7 81.4 - 97.8 fL    MCH 33.5 (H) 27.0 - 33.0 pg    MCHC 34.6 33.7 - 35.3 g/dL    RDW 45.1 35.9 - 50.0 fL    Platelet Count 167 164 - 446 K/uL    MPV 12.9 9.0 - 12.9 fL   Comp Metabolic Panel   Result Value Ref Range    Sodium 141 135 - 145 mmol/L    Potassium 3.9 3.6 - 5.5 mmol/L    Chloride 106 96 - 112 mmol/L    Co2 26 20 - 33 mmol/L    Anion Gap 9.0 0.0 - 11.9    Glucose 109 (H) 65 - 99 mg/dL    Bun " 14 8 - 22 mg/dL    Creatinine 0.84 0.50 - 1.40 mg/dL    Calcium 9.5 8.5 - 10.5 mg/dL    AST(SGOT) 73 (H) 12 - 45 U/L    ALT(SGPT) 122 (H) 2 - 50 U/L    Alkaline Phosphatase 74 30 - 99 U/L    Total Bilirubin 1.0 0.1 - 1.5 mg/dL    Albumin 4.4 3.2 - 4.9 g/dL    Total Protein 7.6 6.0 - 8.2 g/dL    Globulin 3.2 1.9 - 3.5 g/dL    A-G Ratio 1.4 g/dL   TROPONIN   Result Value Ref Range    Troponin T 11 6 - 19 ng/L   ESTIMATED GFR   Result Value Ref Range    GFR If African American >60 >60 mL/min/1.73 m 2    GFR If Non African American >60 >60 mL/min/1.73 m 2   EKG (NOW)   Result Value Ref Range    Report       [facility] Emergency Dept.    Test Date:  2020  Pt Name:    MARY HU                  Department: ER  MRN:        [id number]                      Room:  Gender:     Male                         Technician: [id number]  :        1975                   Requested By:ER TRIAGE PROTOCOL  Order #:    [id number]                    Reading MD: LULU TSE MD    Measurements  Intervals                                Axis  Rate:       85                           P:          31  LA:         161                          QRS:        18  QRSD:       89                           T:          68  QT:         371  QTc:        442    Interpretive Statements  Sinus rhythm  Compared to ECG 2017 10:16:04  Sinus bradycardia no longer present  T-wave abnormality no longer present  Electronically Signed On 2020 9:26:18 PST by LULU TSE MD       EKG interpretation by me rate 85 sinus rhythm no acute ST segment elevation or depression no pathological T wave inversions intervals and axis are normal no ectopy    COURSE & MEDICAL DECISION MAKING  Pertinent Labs & Imaging studies reviewed. (See chart for details)  An IV was established.  Patient was kept nothing by mouth out of concern for possible neurological process.  He was observed for several hours.  He was given a liter of fluid and  clinically feels improved.  I suspect he either had a mild vasovagal episode or he might be slightly dehydrated.  After treatment he feels much better.  He has low risk factors specifically no known structural heart disease diabetes hypertension drug use or family history.  I counseled the patient to establish care with a PCP and to follow-up here as needed for new or worsening symptom    FINAL IMPRESSION  1.  Near syncope         Electronically signed by: Valentino Norris M.D., 2/5/2020 9:25 AM

## 2020-10-14 ENCOUNTER — IMMUNIZATION (OUTPATIENT)
Dept: SOCIAL WORK | Facility: CLINIC | Age: 45
End: 2020-10-14
Payer: COMMERCIAL

## 2020-10-14 DIAGNOSIS — Z23 NEED FOR VACCINATION: Primary | ICD-10-CM

## 2020-11-03 ENCOUNTER — APPOINTMENT (OUTPATIENT)
Dept: SOCIAL WORK | Facility: CLINIC | Age: 45
End: 2020-11-03
Payer: COMMERCIAL

## 2020-11-03 PROCEDURE — 90471 IMMUNIZATION ADMIN: CPT | Performed by: REGISTERED NURSE

## 2020-11-03 PROCEDURE — 90686 IIV4 VACC NO PRSV 0.5 ML IM: CPT | Performed by: REGISTERED NURSE

## 2022-05-04 ENCOUNTER — OFFICE VISIT (OUTPATIENT)
Dept: URGENT CARE | Facility: PHYSICIAN GROUP | Age: 47
End: 2022-05-04
Payer: COMMERCIAL

## 2022-05-04 ENCOUNTER — HOSPITAL ENCOUNTER (OUTPATIENT)
Facility: MEDICAL CENTER | Age: 47
End: 2022-05-04
Attending: NURSE PRACTITIONER
Payer: COMMERCIAL

## 2022-05-04 ENCOUNTER — APPOINTMENT (OUTPATIENT)
Dept: URGENT CARE | Facility: PHYSICIAN GROUP | Age: 47
End: 2022-05-04

## 2022-05-04 VITALS
HEART RATE: 97 BPM | SYSTOLIC BLOOD PRESSURE: 114 MMHG | OXYGEN SATURATION: 94 % | WEIGHT: 280 LBS | TEMPERATURE: 98.3 F | BODY MASS INDEX: 41.47 KG/M2 | RESPIRATION RATE: 16 BRPM | DIASTOLIC BLOOD PRESSURE: 78 MMHG | HEIGHT: 69 IN

## 2022-05-04 DIAGNOSIS — R68.89 FLU-LIKE SYMPTOMS: ICD-10-CM

## 2022-05-04 DIAGNOSIS — Z20.822 SUSPECTED COVID-19 VIRUS INFECTION: ICD-10-CM

## 2022-05-04 LAB
FLUAV+FLUBV AG SPEC QL IA: NEGATIVE
INT CON NEG: NEGATIVE
INT CON POS: POSITIVE

## 2022-05-04 PROCEDURE — 87804 INFLUENZA ASSAY W/OPTIC: CPT | Performed by: NURSE PRACTITIONER

## 2022-05-04 PROCEDURE — 99203 OFFICE O/P NEW LOW 30 MIN: CPT | Mod: CS | Performed by: NURSE PRACTITIONER

## 2022-05-04 PROCEDURE — 0240U HCHG SARS-COV-2 COVID-19 NFCT DS RESP RNA 3 TRGT MIC: CPT

## 2022-05-04 ASSESSMENT — ENCOUNTER SYMPTOMS
SHORTNESS OF BREATH: 0
SORE THROAT: 1
COUGH: 0
EYE DISCHARGE: 0
SINUS PAIN: 1
HEADACHES: 1
CHILLS: 1
FEVER: 1
EYE PAIN: 0
EYE REDNESS: 0

## 2022-05-04 NOTE — PROGRESS NOTES
Subjective:     Gil Mueller is a 46 y.o. male who presents for Illness (Started yesterday , might have sinus infection , woke up this morning with fever, headache )      Currently experiencing sinus pressure, sore throat, and fever of 102 this morning. Denies ear pain, cough. Did have a negative COVID Test yesterday. Took dayquil at 4:30am with minimal relief. Child at home with similar symptoms.   URI   This is a new problem. The current episode started today. The problem has been gradually worsening. The maximum temperature recorded prior to his arrival was 102 - 102.9 F. Associated symptoms include congestion, headaches, sinus pain and a sore throat. Pertinent negatives include no coughing or ear pain.       Review of Systems   Constitutional: Positive for chills and fever.   HENT: Positive for congestion, sinus pain and sore throat. Negative for ear discharge and ear pain.    Eyes: Negative for pain, discharge and redness.   Respiratory: Negative for cough and shortness of breath.    Neurological: Positive for headaches.       PMH:   Past Medical History:   Diagnosis Date   • Anesthesia     aggressive wake up    • Lumbar herniated disc    • Pain      ALLERGIES: No Known Allergies  SURGHX:   Past Surgical History:   Procedure Laterality Date   • LUMBAR LAMINECTOMY DISKECTOMY Left 5/8/2017    Procedure: LUMBAR LAMINECTOMY DISKECTOMY L5-S1 MICRODISC;  Surgeon: Jose Benites M.D.;  Location: SURGERY [facility];  Service:    • FUSION, SPINE, LUMBAR, PLIF Right 7/26/2016    Procedure: LUMBAR FUSION POSTERIOR For: Coflex Stabilization L4-5;  Surgeon: Jose Benites M.D.;  Location: SURGERY [facility];  Service:    • LUMBAR LAMINECTOMY DISKECTOMY Right 7/26/2016    Procedure: LUMBAR LAMINECTOMY DISKECTOMY L4-5 Lami;  Surgeon: Jose Benites M.D.;  Location: SURGERY [facility];  Service:    • KNEE ARTHROSCOPY Left 2013    medial meniscus tear   • HERNIA REPAIR Left age 13   • OTHER ORTHOPEDIC  "SURGERY  age 27    L4-L5 discectomy     SOCHX:   Social History     Socioeconomic History   • Marital status:    Tobacco Use   • Smoking status: Former Smoker     Types: Cigarettes     Quit date: 3/12/2004     Years since quittin.1   • Smokeless tobacco: Current User     Types: Chew   Vaping Use   • Vaping Use: Never used   Substance and Sexual Activity   • Alcohol use: Yes     Alcohol/week: 0.0 oz     Comment: socially   • Drug use: No     Comment: DC'd  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   Constitutional:       General: He is not in acute distress.     Appearance: Normal appearance. He is normal weight. He is ill-appearing.   HENT:      Head: Normocephalic and atraumatic.      Right Ear: Tympanic membrane, ear canal and external ear normal. There is no impacted cerumen.      Left Ear: Tympanic membrane, ear canal and external ear normal. There is no impacted cerumen.      Nose: No congestion or rhinorrhea.      Mouth/Throat:      Mouth: Mucous membranes are moist.      Pharynx: No oropharyngeal exudate or posterior oropharyngeal erythema.   Eyes:      Extraocular Movements: Extraocular movements intact.      Pupils: Pupils are equal, round, and reactive to light.   Cardiovascular:      Rate and Rhythm: Normal rate and regular rhythm.      Pulses: Normal pulses.      Heart sounds: Normal heart sounds.   Pulmonary:      Effort: Pulmonary effort is normal. No respiratory " distress.      Breath sounds: Normal breath sounds. No stridor. No wheezing, rhonchi or rales.   Chest:      Chest wall: No tenderness.   Abdominal:      General: Abdomen is flat. Bowel sounds are normal.      Palpations: Abdomen is soft.      Tenderness: There is abdominal tenderness. There is no right CVA tenderness or left CVA tenderness.   Musculoskeletal:         General: Normal range of motion.      Cervical back: Normal range of motion and neck supple. Tenderness present.   Lymphadenopathy:      Cervical: Cervical adenopathy present.   Skin:     General: Skin is warm and dry.      Capillary Refill: Capillary refill takes less than 2 seconds.   Neurological:      General: No focal deficit present.      Mental Status: He is alert and oriented to person, place, and time. Mental status is at baseline.   Psychiatric:         Mood and Affect: Mood normal.         Behavior: Behavior normal.         Thought Content: Thought content normal.         Judgment: Judgment normal.         Assessment/Plan:   Assessment    1. Flu-like symptoms  CoV-2 and Flu A/B by PCR (24 hour In-House): Collect NP swab in VTM    POCT Influenza A/B   2. Suspected COVID-19 virus infection  CoV-2 and Flu A/B by PCR (24 hour In-House): Collect NP swab in VTM    POCT Influenza A/B     Pt was tested for COVID today. I will call with results. Upon entering the room PPE was worn throughout the duration of his visit for both provider and patient. Mask of patient briefly removed for examination of oropharynx. PT was educated to remain in self isolation for at least 5 days from onset of symptoms followed by an additional 24-hour period of being symptom-free without the use of symptom altering medication.    We discussed supportive measures including humidifier, warm salt water gargles, over-the-counter Cepacol throat lozenges, rest  and increased fluids. Pt was encouraged to seek treatment back in the ER or urgent care for worsening symptoms,  fever  greater than 100.5, wheezes or shortness of breath.    AVS handout given and reviewed with patient. Pt educated on red flags and when to seek treatment back in ER or UC.

## 2022-05-05 LAB
FLUAV RNA SPEC QL NAA+PROBE: NEGATIVE
FLUBV RNA SPEC QL NAA+PROBE: NEGATIVE
SARS-COV-2 RNA RESP QL NAA+PROBE: NOTDETECTED
SPECIMEN SOURCE: NORMAL

## 2024-02-13 ENCOUNTER — OFFICE VISIT (OUTPATIENT)
Dept: INTERNAL MEDICINE | Facility: OTHER | Age: 49
End: 2024-02-13
Payer: COMMERCIAL

## 2024-02-13 ENCOUNTER — HOSPITAL ENCOUNTER (OUTPATIENT)
Dept: LAB | Facility: MEDICAL CENTER | Age: 49
End: 2024-02-13
Attending: INTERNAL MEDICINE
Payer: COMMERCIAL

## 2024-02-13 ENCOUNTER — PATIENT MESSAGE (OUTPATIENT)
Dept: INTERNAL MEDICINE | Facility: OTHER | Age: 49
End: 2024-02-13

## 2024-02-13 VITALS
BODY MASS INDEX: 40.77 KG/M2 | HEART RATE: 87 BPM | HEIGHT: 68 IN | TEMPERATURE: 97.9 F | WEIGHT: 269 LBS | SYSTOLIC BLOOD PRESSURE: 135 MMHG | OXYGEN SATURATION: 98 % | DIASTOLIC BLOOD PRESSURE: 95 MMHG

## 2024-02-13 DIAGNOSIS — I10 PRIMARY HYPERTENSION: ICD-10-CM

## 2024-02-13 DIAGNOSIS — F41.9 ACUTE ANXIETY: ICD-10-CM

## 2024-02-13 DIAGNOSIS — E66.01 MORBID OBESITY (HCC): ICD-10-CM

## 2024-02-13 PROBLEM — E66.9 OBESITY (BMI 30-39.9): Status: RESOLVED | Noted: 2017-04-24 | Resolved: 2024-02-13

## 2024-02-13 LAB
T4 FREE SERPL-MCNC: 1.31 NG/DL (ref 0.93–1.7)
TSH SERPL DL<=0.005 MIU/L-ACNC: 1.42 UIU/ML (ref 0.38–5.33)

## 2024-02-13 PROCEDURE — 84439 ASSAY OF FREE THYROXINE: CPT

## 2024-02-13 PROCEDURE — 36415 COLL VENOUS BLD VENIPUNCTURE: CPT

## 2024-02-13 PROCEDURE — 99204 OFFICE O/P NEW MOD 45 MIN: CPT | Performed by: INTERNAL MEDICINE

## 2024-02-13 PROCEDURE — 3075F SYST BP GE 130 - 139MM HG: CPT | Performed by: INTERNAL MEDICINE

## 2024-02-13 PROCEDURE — 3080F DIAST BP >= 90 MM HG: CPT | Performed by: INTERNAL MEDICINE

## 2024-02-13 PROCEDURE — 84443 ASSAY THYROID STIM HORMONE: CPT

## 2024-02-13 RX ORDER — HYDROXYZINE HYDROCHLORIDE 25 MG/1
25 TABLET, FILM COATED ORAL 3 TIMES DAILY PRN
Qty: 60 TABLET | Refills: 1 | Status: SHIPPED | OUTPATIENT
Start: 2024-02-13

## 2024-02-13 ASSESSMENT — PATIENT HEALTH QUESTIONNAIRE - PHQ9
CLINICAL INTERPRETATION OF PHQ2 SCORE: 4
5. POOR APPETITE OR OVEREATING: 3 - NEARLY EVERY DAY
SUM OF ALL RESPONSES TO PHQ QUESTIONS 1-9: 20

## 2024-02-13 NOTE — PATIENT INSTRUCTIONS
Try the hydroxyzine as needed for anxiety.  Get blood work done (non-fasting) prior to your next visit.

## 2024-02-13 NOTE — PROGRESS NOTES
New Patient    Gil Mueller is a 48 y.o. male who presents today with the following:    CC: Establish Care with Primary Care Physician and for follow-up for anxiety/panic attack issues and emergency room visit.    HPI:  1. Acute anxiety  Mr. Mueller comes in today for evaluation and establishment of care following a recent emergency department visit.  Within the last couple of weeks he had what he describes as a significant and stressful interaction with his supervisor at work.  He describes a previous work history over the last 1 and half to 2 years of a very toxic environment, and within the last month or so has a new supervisor.  A couple of weeks ago he had a significant run-ins with this new supervisor which left him feeling quite stressed and anxious.  On February 7, 2024, he had been having some significant fatigue vomiting and palpitations.  He had been confined to bed because of his symptoms for a day or so.  He experienced palpitations and tingling in his extremities as well as some vision changes.  Being concerned about possible significant issues such as myocardial infarction, he went to the [facility] emergency department for evaluation.  Workup there did not reveal any acute abnormalities other than the possibility of a anxiety attack/panic attack that was leading to his symptoms.  He was given hydroxyzine in the emergency department which he felt helped to control his symptoms significantly.  He was ultimately discharged home with plans for follow-up and establishment of primary care.    He has never had any symptoms in the past that are consistent with or that he has ever specifically labeled as panic attacks or anxiety however he does have some significant previous life stressors including his daughter being murdered.     He comes in today for further evaluation and consideration of referral onto psychology or psychiatry.    2. Primary hypertension  Blood pressure was elevated  in the emergency department, and was noted to be elevated here as well at 135/95.  He is not currently on blood pressure medications, but does carry a diagnosis of hypertension.  He does not have home blood pressure readings to report today.    3. Morbid obesity (HCC)  Noted as part of his medical history, but deferred given the acuity of the above symptoms.      ROS:     Constitutional: Weight loss associated with poor appetite for the last 2 weeks.  Eyes: Some visual changes as above  Ears, Nose, Mouth, Throat: Negative  Cardiovascular: Palpitations as above  Respiratory: Negative  Gastrointestinal: Nausea vomiting and loose bowel movements.  Genitourinary: Negative  Musculoskeletal: Negative  Integumentary: Negative  Neurological: Distal extremity tingling  Psychiatric: Anxiety as above  Endocrine: Negative  Hematologic/Lymphatic: Negative  Allergic/Immunologic: Negative    Past Medical History:   Diagnosis Date    Anesthesia     aggressive wake up     Lumbar herniated disc     Pain        Past Surgical History:   Procedure Laterality Date    LUMBAR LAMINECTOMY DISKECTOMY Left 5/8/2017    Procedure: LUMBAR LAMINECTOMY DISKECTOMY L5-S1 MICRODISC;  Surgeon: Jose Benites M.D.;  Location: SURGERY [facility];  Service:     FUSION, SPINE, LUMBAR, PLIF Right 7/26/2016    Procedure: LUMBAR FUSION POSTERIOR For: Coflex Stabilization L4-5;  Surgeon: Jose Benites M.D.;  Location: SURGERY [facility];  Service:     LUMBAR LAMINECTOMY DISKECTOMY Right 7/26/2016    Procedure: LUMBAR LAMINECTOMY DISKECTOMY L4-5 Lami;  Surgeon: Jose Benites M.D.;  Location: SURGERY [facility];  Service:     KNEE ARTHROSCOPY Left 2013    medial meniscus tear    HERNIA REPAIR Left age 13    OTHER ORTHOPEDIC SURGERY  age 27    L4-L5 discectomy       Family History   Problem Relation Age of Onset    Cancer Mother         pancreas, liver    Diabetes Neg Hx     Heart Disease Neg Hx     Stroke Neg Hx        Social  "History     Tobacco Use    Smoking status: Former     Current packs/day: 0.00     Types: Cigarettes     Quit date: 3/12/2004     Years since quittin.9    Smokeless tobacco: Current     Types: Chew   Vaping Use    Vaping Use: Never used   Substance Use Topics    Alcohol use: Yes     Alcohol/week: 0.0 oz     Comment: socially    Drug use: No     Comment: DC'd  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 daily        Current Outpatient Medications   Medication Sig Dispense Refill    hydrOXYzine HCl (ATARAX) 25 MG Tab Take 1 Tablet by mouth 3 times a day as needed for Anxiety. 60 Tablet 1     No current facility-administered medications for this visit.       Physical Exam:  BP (!) 135/95 (BP Location: Right arm, Patient Position: Sitting, BP Cuff Size: Adult)   Pulse 87   Temp 36.6 °C (97.9 °F) (Temporal)   Ht 1.727 m (5' 8\")   Wt 122 kg (269 lb)   SpO2 98%   BMI 40.90 kg/m²   General: Well-developed, well-nourished male in no distress  HEENT: Conjuntiva and lids are within normal limits.  External auditory canals are within normal limits.  Tympanic membranes are clear with a normal light refflex.  Nasal mucosa is normal.  Oral pharynx is normal and free of exudate.  Neck: Supple, non-tender with nothyromegaly noted.  Lympatic: Pre and Post auricular, sub-mandibular, anterior and posterior cervical and supraclavicular areas are without notable lymphadenopathy  Lungs: Clear to auscultation and perucssion bilaterally with good respiratory effort.  No wheezes, crackes or rhonci are heard.  Heart: Normal rate, regular rhythm with no murmurs, rubs or gallops heard.  Abdomen: Soft, non-tender, non-distended with normal-active bowel sounds.  Nor organomegaly noted.  Extremites: No edema  Psych: Patient is awake, alert and oriented with recent and remote memory intact.  Anxious affect in the office today.    Assessment and Plan:     1. Acute anxiety  I think he needs urgent/stat evaluation by psychology.  We discussed the potential for a " psychiatry evaluation however without any significant history, and the fact that medications for long-term management seem less of a need right now then exploration of his current stressful situation, things that are triggering his anxiety and the ability to develop some coping skills for the stressful situation seems more urgent.  Given the fact that he had significant improvement when he got a dose of hydroxyzine in the emergency department, I think it is reasonable for him to use this as needed at home and I have given him a prescription for this to use as well.  Will plan for some basic labs including thyroid.  Labs from his hospitalization were within normal limits.  - TSH; Future  - FREE THYROXINE; Future  - Referral to Psychology    2. Primary hypertension  Blood pressure is elevated today, but this is reasonable given his current level of anxiety.  No need for treatment at this time however I would like him to do some home blood pressure monitoring and we can continue to evaluate this in the future.    3. Morbid obesity (HCC)  Will defer for now.  But plan to address at future visits.       Return in about 3 weeks (around 3/5/2024).    Patient Instructions   Try the hydroxyzine as needed for anxiety.  Get blood work done (non-fasting) prior to your next visit.      Signed by: Alex Morris M.D.      Alex Morris M.D.   of Internal Medicine  [facility]    This note was created using voice recognition software.  While every attempt is made to ensure accuracy of transcription, occasionally errors occu

## 2024-02-14 ENCOUNTER — PATIENT MESSAGE (OUTPATIENT)
Dept: INTERNAL MEDICINE | Facility: OTHER | Age: 49
End: 2024-02-14
Payer: COMMERCIAL

## 2024-02-15 ENCOUNTER — APPOINTMENT (OUTPATIENT)
Dept: MEDICAL GROUP | Facility: MEDICAL CENTER | Age: 49
End: 2024-02-15
Payer: COMMERCIAL

## 2024-02-20 NOTE — TELEPHONE ENCOUNTER
I spoke to the patient this morning, and recommended that he continue to avoid the stressful environment of work since that seems to be the trigger for his acute anxiety episodes.  I filled out his short-term disability paperwork as well.

## 2024-02-27 ENCOUNTER — OFFICE VISIT (OUTPATIENT)
Dept: BEHAVIORAL HEALTH | Facility: CLINIC | Age: 49
End: 2024-02-27
Payer: COMMERCIAL

## 2024-02-27 DIAGNOSIS — Z63.4 GRIEF AT LOSS OF CHILD: ICD-10-CM

## 2024-02-27 DIAGNOSIS — F43.21 GRIEF AT LOSS OF CHILD: ICD-10-CM

## 2024-02-27 DIAGNOSIS — F41.1 ANXIETY AS ACUTE REACTION TO EXCEPTIONAL STRESS: ICD-10-CM

## 2024-02-27 DIAGNOSIS — F43.0 ANXIETY AS ACUTE REACTION TO EXCEPTIONAL STRESS: ICD-10-CM

## 2024-02-27 PROCEDURE — 90791 PSYCH DIAGNOSTIC EVALUATION: CPT | Performed by: MARRIAGE & FAMILY THERAPIST

## 2024-02-27 SDOH — SOCIAL STABILITY - SOCIAL INSECURITY: DISSAPEARANCE AND DEATH OF FAMILY MEMBER: Z63.4

## 2024-02-27 NOTE — PROGRESS NOTES
[facility]   Initial Assessment    Name: Gil Mueller  MRN: [id number]  : 1975  Age: 48 y.o.  Date of assessment: 2024  PCP: Alex Morris M.D.  Persons in attendance: Patient    CHIEF COMPLAINT AND HISTORY OF PRESENTING PROBLEM:  Gil Mueller is a 48 y.o., White male referred for assessment by Alex Morris M.D..  Pt told his story about recent work events which likely led to his feeling significant stress and anxiety, culminating in a panic attack, which precipitated taking himself to the ER. Was cleared medically. He cont to feel episodes of intense anxiety. Pt ponders 'Why can't I fix it?' Referring to his marriage, parenting, and work situation. Pt imparts clearly he derives a lot of satisfaction from, obtains an important part of his identity from his job..    Pt also shared other, longstanding issues: his own felony conviction years ago and accompanying 'mistakes,', his children going into foster care, marital problems, significant parenting differences, unresolved grief over 17 y-o daughter's death.    Pt wanting desperately to find 'a path to fix what's going on in my life right now. I miss my family, I,m sleeping on a friend's couch, I want to get back to work.'    BEHAVIORAL HEALTH TREATMENT HISTORY  Does patient/parent report a history of prior behavioral health treatment for patient? Yes, met briefly with [facility] provider for work issues. Pt states was not a good fit.    History of untreated behavioral health issues identified? Yes: see Chief Complaint above  Does patient/parent report change in appetite or weight loss/gain? no    FAMILY/SOCIAL HISTORY  Current living situation/household members:  27 years, has 5 children. Presently staying with a friend as he and wife chose to separate, given severity and complexity of parenting and relationship differences  Does patient/parent report a family history of behavioral health issues, diagnoses, or treatment?    Family History   Problem Relation Age of Onset    Cancer Mother         pancreas, liver    Diabetes Neg Hx     Heart Disease Neg Hx     Stroke Neg Hx         EMPLOYMENT/RESOURCES  Is the patient currently employed? Yes; on short term disability  Does the patient/parent report adequate financial resources? Yes    SPIRITUAL/CULTURAL/IDENTITY:  What are the patient's/family's spiritual beliefs or practices? Eastern philosophical practices, not necessarily Buddhist    ABUSE/NEGLECT/TRAUMA SCREENING  Does patient report feeling “unsafe” in his/her home, or afraid of anyone? No  Does patient report any history of physical, sexual, or emotional abuse? No  Is there evidence of neglect by self? No                                                                                                        SAFETY ASSESSMENT - SELF  Does patient acknowledge current or past symptoms of dangerousness to self? No  Recent change in frequency/specificity/intensity of suicidal thoughts or self-harm behavior? No  Current access to firearms, medications, or other identified means of suicide/self-harm? No  If yes, willing to restrict access to means of suicide/self-harm? Yes    Current Suicide Risk: Low  Crisis Safety Plan completed and copy given to patient: not applicable    SAFETY ASSESSMENT - OTHERS  Recent change in frequency/specificity/intensity of thoughts or threats to harm others? no  If Yes:  Current access to firearms/other identified means of harm?   If yes, willing to restrict access to weapons/means of harm?     Current Homicide Risk:  Low  Crisis Safety Plan completed and copy given to patient? not applicable  Based on information provided during the current assessment, is a mandated “duty to warn” being exercised? No    SUBSTANCE USE/ADDICTION HISTORY   Yes, responsible of use alcohol, infrequently. Remote Hx of alcohol abuse  If yes:  Is there a family history of substance use/addiction? No  Does patient acknowledge or  parent/significant other report use of/dependence on substances? No  Last time patient used alcohol: this week, responsible, infrequent use  Within the past week? Yes  Last time patient used marijuana: n/a  Within the past month? No  Any other street drugs ever tried even once? Yes, remote Hx, not presently applicable  Any use of prescription medications/pills without a prescription, or for reasons others than originally prescribed?  No, Rx hydroxyzine x3/day PRN  Any other addictive behavior reported (gambling, shopping, sex)? Yes I know it's unhealthy, I love shoes, I have over 200 pairs of expensive shoes. It's stg I shared with my [] daughter    MENTAL STATUS/OBSERVATIONS              Participation: Active verbal participation  Grooming: Casual  Orientation:Alert   Behavior: Tense; Pt endorsed feeling anxious, thoughts, heart rate, but was able to stay focused on conversation at hand  Eye contact: Good          Mood:Euthymic  Affect:Congruent with content  Thought process: Logical  Speech: Pressured  Memory: No gross evidence of memory deficits  Insight: Adequate  Judgment:  Good    Interpretation of TORI 7 Total Score   Score Severity:  0-4 No Anxiety   5-9 Mild Anxiety  10-14 Moderate Anxiety  15-21 Severe Anxiety   Patient's motivation/readiness for change: Pt wants to get back to work--soon, but recognizes he needs to understand and work within the process of short-term disability, and work with his employment HR dept. Pt needs clarity about what exactly he needs to do to get back to work. He states 'I want to create path to fix things.'    Care plan completed: No  Will complete plan next session    Diagnosis: F43.10 Anxiety Disorder  Z63.4 Uncomplicated Grief    PASHA Bedoay

## 2024-03-05 ENCOUNTER — OFFICE VISIT (OUTPATIENT)
Dept: INTERNAL MEDICINE | Facility: OTHER | Age: 49
End: 2024-03-05
Payer: COMMERCIAL

## 2024-03-05 ENCOUNTER — OFFICE VISIT (OUTPATIENT)
Dept: BEHAVIORAL HEALTH | Facility: CLINIC | Age: 49
End: 2024-03-05
Payer: COMMERCIAL

## 2024-03-05 VITALS
DIASTOLIC BLOOD PRESSURE: 98 MMHG | WEIGHT: 278.4 LBS | SYSTOLIC BLOOD PRESSURE: 157 MMHG | HEART RATE: 87 BPM | TEMPERATURE: 97.7 F | BODY MASS INDEX: 42.19 KG/M2 | OXYGEN SATURATION: 94 % | HEIGHT: 68 IN

## 2024-03-05 DIAGNOSIS — Z63.4 BEREAVEMENT, UNCOMPLICATED: ICD-10-CM

## 2024-03-05 DIAGNOSIS — F41.9 ANXIETY DISORDER, UNSPECIFIED TYPE: ICD-10-CM

## 2024-03-05 DIAGNOSIS — I10 PRIMARY HYPERTENSION: ICD-10-CM

## 2024-03-05 DIAGNOSIS — F41.9 ANXIETY: ICD-10-CM

## 2024-03-05 PROCEDURE — 90837 PSYTX W PT 60 MINUTES: CPT | Performed by: MARRIAGE & FAMILY THERAPIST

## 2024-03-05 PROCEDURE — 3077F SYST BP >= 140 MM HG: CPT | Performed by: INTERNAL MEDICINE

## 2024-03-05 PROCEDURE — 3080F DIAST BP >= 90 MM HG: CPT | Performed by: INTERNAL MEDICINE

## 2024-03-05 PROCEDURE — 99214 OFFICE O/P EST MOD 30 MIN: CPT | Performed by: INTERNAL MEDICINE

## 2024-03-05 SDOH — SOCIAL STABILITY - SOCIAL INSECURITY: DISSAPEARANCE AND DEATH OF FAMILY MEMBER: Z63.4

## 2024-03-05 NOTE — PROGRESS NOTES
"[facility]   Therapy Progress Note        Name: Gil Mueller  MRN: [id number]  : 1975  Age: 48 y.o.  Date of assessment: 3/5/2024  PCP: Alex Morris M.D.  Persons in attendance: Patient  Total session time: 60 minutes    Pt reports: He cont to try to obtain accurate information about the status of his work/leave situation, and what he needs to do to stay off work versus returning to work. He believes he's cont to receive his full salary while not presently working. Pt's work situation has him exceedingly stressed, anxious, struggling to understand 'why can't I fix it?'  He's quite perplexed about his employer's lack of a meaningful response to his legitimate concerns about work culture in general, and specific incidernts. He reports he cont to feel anxious, cognitively, and physiological Sx as well.    Pt reports things are not well in his marriage, he's again staying with family and not at home with wife and [youngest] daughter, and he has even suggested to wife they divorce.    Pt states he's not ready, wanting to process his [older] daughter's murder, recognizing he has severe grief over this.    Pt added today his older sister is a deacon in the [employer], and he relies on her support and understanding in this difficult time. Pt cont to go to gym most days    Objective Observations:   Participation:Active verbal participation   Grooming:Casual   Cognition:Fully Oriented   Eye Contact:Good   Mood:Anxious   Affect:Flexible   Thought Process:Goal-directed   Speech:Pressured somewhat, but focused    Current Risk:   Suicide: low   Homicide: low   Self-Harm: low   Relapse: na   Safety Plan Reviewed: no    Evaluation: Pt participatory, goal oriented, very much needing to process his current struggles: marriage, family, work, caring for himself.    Plan: Cont to meet weekly, support Pt as he further develops his plan to \"fix it:' marriage, family, work, himself.    Care Plan Updated: " No    Does patient express agreement with the treatment plan? Yes     Diagnosis: F41.9 Unspecified  Anxiety Disorder; Z63.4 Uncomplicated Grief    PASHA Bedoya

## 2024-03-05 NOTE — PROGRESS NOTES
"    Established Patient    Patient Care Team:  Alex Morris M.D. as PCP - General (Internal Medicine)    Gil Mueller is a 48 y.o. male who presents today with the following Chief Complaint(s):  Chief Complaint   Patient presents with    Follow-Up    and for follow up for Diagnoses of Anxiety and Primary hypertension were pertinent to this visit.    ROS:     Denies any new chest pain or shortness of breath.  No changes to urinary or bowel function.  See HPI.    Past Medical History:   Diagnosis Date    Anesthesia     aggressive wake up     Lumbar herniated disc     Pain      Social History     Tobacco Use    Smoking status: Former     Current packs/day: 0.00     Types: Cigarettes     Quit date: 3/12/2004     Years since quittin.9    Smokeless tobacco: Current     Types: Chew   Vaping Use    Vaping Use: Never used   Substance Use Topics    Alcohol use: Yes     Alcohol/week: 0.0 oz     Comment: socially    Drug use: No     Comment: DC'd  meth  marijuana daily      Current Outpatient Medications   Medication Sig Dispense Refill    hydrOXYzine HCl (ATARAX) 25 MG Tab Take 1 Tablet by mouth 3 times a day as needed for Anxiety. 60 Tablet 1     No current facility-administered medications for this visit.       Physical Exam:  BP (!) 157/98 (BP Location: Right arm, Patient Position: Sitting, BP Cuff Size: Adult)   Pulse 87   Temp 36.5 °C (97.7 °F) (Temporal)   Ht 1.727 m (5' 8\")   Wt (!) 126 kg (278 lb 6.4 oz)   SpO2 94%   BMI 42.33 kg/m²   General: Well developed, well nourished male, in no distress.  Eyes: Conjuntiva without any obvious injection or erythema.   Cardiovascular: Heart is regular with no murmur  Lungs: Clear to auscultation bilaterally. No wheezes, rhonci or crackles heard. Respiratory effort is normal.  Abd: Soft, non-tender  Ext: No edema    HPI / Assessment / Plan:  1. Anxiety  He comes in today for follow-up for his anxiety.  At his last visit we discussed this in depth, including " many of the factors that contribute to this.  The major factor being stressful work environment.  Unfortunately, since his last visit he has now had some additional stressors at home that have added to this.  He is not yet back to work.    We discussed his work options today, and given the fact that work stress seems to be the predominant exacerbating factor, I recommended that until he has developed the psychological coping skills necessary to manage those stressors, that it would be ideal for him to stay out of work.  He is seeing a therapist now, having seen him last week for the first time.  He has another appointment today and again next week.  He is hopeful, as am I, that working with a therapist will help him develop those psychological coping skills that he needs.    He has been using hydroxyzine 25 mg 3 times a day and is finding that the effectiveness of this has started to wane a bit.  He is interested in increasing the dose up to 50 mg occasionally to help with his symptoms.    We discussed other medications, and he is hesitant to start any other medications for now.  In particular, he wants to avoid any medications that could be habit-forming, which I agree with.    Plan:  I will plan to see him back in 4 to 6 weeks for another evaluation.  In the meantime, he should continue to work with his therapist to develop the necessary psychological coping skills.  With regard to return to work, I would like to have him wait until I can reconnect with him in 4 to 6 weeks to assess the improvement he might be making with therapy.  Also, if he and his therapist decide that adjunct of therapy with medication such as SSRI is indicated, I can start him on this as well.  If he feels like he has developed the skills necessary prior to his next visit with me, he could return to work earlier.    2. Primary hypertension  Blood pressure remains elevated in the office today.  He has not been doing home blood pressure  checks.  Given his acute anxiety, I do think that it could be contributing to his high blood pressure.  I would not like to leave this high for too long, but for now he was encouraged to get started doing home blood pressure checks, and I can reassess the need for blood pressure medications when I next see him.     Return in about 5 weeks (around 4/9/2024).    Alex Morris M.D.   of Internal Medicine  [facility]    This note was created using voice recognition software.  While every attempt is made to ensure accuracy of transcription, occasionally errors occur.    .cc

## 2024-03-05 NOTE — PATIENT INSTRUCTIONS
"Check your blood pressure at home 3-4 times per week.  Record those readings and bring them with you to your next appointment.    When you check it, do it seated with your feet on the floor, your back supported and your arm resting at heart level (such as on the kitchen table).  Check it three times,  by approximately 1 minute between each reading.  Take the average of the three readings and record that as your blood pressure for the day.    If you do not already have a well functioning home blood pressure monitor, check out this website: \"validatebp.org\", to find one that has been tested and shown to be accurate.  Once you find one on this website, purchasing it online (like on Amazon.com) should be possible - just be sure to match up the model number to ensure you order the correct one.    If you struggle finding one - get the Omeron Series 3, 5 or 7 at [employer].    "

## 2024-03-12 ENCOUNTER — APPOINTMENT (OUTPATIENT)
Dept: BEHAVIORAL HEALTH | Facility: CLINIC | Age: 49
End: 2024-03-12
Payer: COMMERCIAL

## 2024-03-14 ENCOUNTER — OFFICE VISIT (OUTPATIENT)
Dept: BEHAVIORAL HEALTH | Facility: CLINIC | Age: 49
End: 2024-03-14
Payer: COMMERCIAL

## 2024-03-14 DIAGNOSIS — Z63.4 BEREAVEMENT, UNCOMPLICATED: ICD-10-CM

## 2024-03-14 DIAGNOSIS — F41.9 ANXIETY: ICD-10-CM

## 2024-03-14 PROCEDURE — 90837 PSYTX W PT 60 MINUTES: CPT | Performed by: MARRIAGE & FAMILY THERAPIST

## 2024-03-14 SDOH — SOCIAL STABILITY - SOCIAL INSECURITY: DISSAPEARANCE AND DEATH OF FAMILY MEMBER: Z63.4

## 2024-03-14 NOTE — PROGRESS NOTES
[facility]   Therapy Progress Note        Name: Gil Mueller  MRN: [id number]  : 1975  Age: 48 y.o.  Date of assessment: 3/14/2024  PCP: Alex Morris M.D.  Persons in attendance: Patient  Total session time: 60 minutes    Pt reports: He's presently taking FMLA, which can last up to 26 weeks, still earning a portion of his salary, per his HR contact at work.. Pt taking tangible steps to care for himself via going to gym, attending to sleep, setting approp boundaries with coworkers [given he's on leave from work], taking his BP daily, increasing involvement and contribution at home, parenting, and eating well.     Pt reports an on-again off-again high level of anxiety, but no 'panic attacks.'    Feels/believes marital relationship is causing significant stress. And, seeing wife in distress causes Pt distress Wife had first meeting with her PCP 'to get herself healthy again...so we can get ourselves healthy again' [as  people]. Pt described in detail longstanding and acute nature of marital, relationship discord btwn he and his wife.    Objective Observations:   Participation:Active verbal participation   Grooming:Casual   Cognition:Alert   Eye Contact:Good   Mood:Anxious   Affect:Anxious   Thought Process:Logical   Speech:S bit pressured, yet focused    Current Risk:   Suicide: low   Homicide: low   Self-Harm: low   Relapse: na   Safety Plan Reviewed: yes    Evaluation:  Pt cont to be participatory in session, if not a bit fixated on the minutiae of recent interactions which are causing him anxiety and distress, yet he also cont to be goal oriented, very much needing to process his current struggles: marriage, family, work, caring for himself.     Plan: meet weekly, and cont Tx foci    Care Plan Updated: No    Does patient express agreement with the treatment plan? Yes     Diagnosis: F41.9 Unspecified  Anxiety Disorder; Z63.4 Uncomplicated Grief     PASHA Bedoya

## 2024-03-19 ENCOUNTER — APPOINTMENT (OUTPATIENT)
Dept: BEHAVIORAL HEALTH | Facility: CLINIC | Age: 49
End: 2024-03-19
Payer: COMMERCIAL

## 2024-03-26 ENCOUNTER — OFFICE VISIT (OUTPATIENT)
Dept: BEHAVIORAL HEALTH | Facility: CLINIC | Age: 49
End: 2024-03-26
Payer: COMMERCIAL

## 2024-03-26 DIAGNOSIS — Z63.4 BEREAVEMENT, UNCOMPLICATED: ICD-10-CM

## 2024-03-26 DIAGNOSIS — F41.9 ANXIETY: ICD-10-CM

## 2024-03-26 PROCEDURE — 90837 PSYTX W PT 60 MINUTES: CPT | Performed by: MARRIAGE & FAMILY THERAPIST

## 2024-03-26 SDOH — SOCIAL STABILITY - SOCIAL INSECURITY: DISSAPEARANCE AND DEATH OF FAMILY MEMBER: Z63.4

## 2024-03-26 NOTE — PROGRESS NOTES
[facility]   Therapy Progress Note        Name: Gil Mueller  MRN: [id number]  : 1975  Age: 48 y.o.  Date of assessment: 3/26/2024  PCP: Alex Morris M.D.  Persons in attendance: Patient  Total session time: 60 minutes    Pt reports: x2 'panic attacks' past week, having trouble getting out of bed over past week. Pt, wife having to look for new residence. Pt had great difficulty dong rental application, trouble staying focused on task at hand, process of moving with wife's input; she's been helpful, participatory, supportive of what they want for their family.    Staying in contact with coworkers? He's trying to limit contact. Pt still in contact with one coworker, who is actually more of a friend, it's good, and Pt knows and has been requested that his employer has asked Pt himself and his coworkers to stop contact with one another given Pt is on leave from work    Pt and his concern for his wife's health? She's applied  for a couple jobs, she's following thru on attending to her own medical concerns.    Pt's marriage relationship? Pt/wife know each other well; they've been  27 years. Simultaneously Pt recognizes he 'needs to make it about 'me' v 'we' at the moment, but is closely tracking and trying to improve nature of marriage relationship    Objective Observations:   Participation:Active verbal participation   Grooming:Casual   Cognition:Fully Oriented   Eye Contact:Good   Mood:Anxious   Affect:Flexible   Thought Process:Logical   Speech:Somewhat pressured, yet modulated    Current Risk:   Suicide: low   Homicide: low   Self-Harm: low   Safety Plan Reviewed: not applicable    Evaluation: Pt cont to engage therapeutic process, trying to succeed, be productive, make meaningful choices, behave maturely and responsibly, establish and maintain healthy and appropriate boundaries in all roles.     Plan: Pt's focuses:  1] Focus on maintaining/improving is own health, by tracking his   BP: exercise, walk dogs, gym, diet, R+R, father-daughter time,  -wife time    2] Cont to look for ways to improve quality of marriage relationship, and to strengthen him marriage    2] Establish and maintain healthy and appropriate boundaries, particularly with coworkers/friends, as he is currently on FMLA and has been advised/instructed/understands that contact with individuals he works with to discuss work concerns is not appropriate    Care Plan Updated: Yes    Does patient express agreement with the treatment plan? Yes     Diagnosis: F41.9 Unspecified Anxiety Disorder; Z63.4 Uncomplicated Grief     PASHA Bedoya

## 2024-04-02 ENCOUNTER — APPOINTMENT (OUTPATIENT)
Dept: BEHAVIORAL HEALTH | Facility: CLINIC | Age: 49
End: 2024-04-02
Payer: COMMERCIAL

## 2024-04-02 DIAGNOSIS — F41.9 ANXIETY DISORDER, UNSPECIFIED TYPE: ICD-10-CM

## 2024-04-02 DIAGNOSIS — Z63.4 BEREAVEMENT, UNCOMPLICATED: ICD-10-CM

## 2024-04-02 PROCEDURE — 90837 PSYTX W PT 60 MINUTES: CPT | Mod: 95 | Performed by: MARRIAGE & FAMILY THERAPIST

## 2024-04-02 SDOH — SOCIAL STABILITY - SOCIAL INSECURITY: DISSAPEARANCE AND DEATH OF FAMILY MEMBER: Z63.4

## 2024-04-02 NOTE — PROGRESS NOTES
[facility]   Therapy Progress Note        Name: Gil Mueller  MRN: [id number]  : 1975  Age: 48 y.o.  Date of assessment: 2024  PCP: Alex Morris M.D.  Persons in attendance: Patient  Total session time: 60 minutes    Pt reports, with regard to his feeling as though he's managing and functioning better, it's his daughter's birthday today, and his family intends to celebrate it, they are giving due attention to it, as it's important to all of Gil's family. Wife now meeting with her own therapist: 'so far, so good;' Pt allowing wife to take whatever course thru therapy she finds meaningful. Wife also getting medical issues checked out, that is, getting her health issues in order. Pt feels convo's btwn he and his wife, wanting to care for themselves, be parents, is likely what's motivating her to take these steps.     Pt and wife cont looking for another place to live; have a couple walk-thru's to look at potential residences. Pt feels moving is the right thing to do for he and his family. He's doing it deliberately and with purpose.    Behavioral changes Pt has been making: walk with his wife and their dogs; Pt getting to the gym regularly; cont to monitor his BP; eating well.    Any further panic attacks past week? Pt reports no 'panic,' but some increased anxiety when dealing with continued time off work issue has to be negotiated. He's trying to find ways, implement relaxation skills, to better manage his anxiety when he feels it ramping up. Pt working with his doctor to continue his short-term disability. Pt needs to decide on a [id number]-ibil date. How does Pt feel about going back to work, and when? Visit this topic next session    Objective Observations:   Participation:Active verbal participation   Grooming:Casual   Cognition:Alert   Eye Contact: none, Zoom session   Mood:Euthymic   Affect:not assessed, Zoom session   Thought Process: Goal-directed   Speech:Rate within normal  limits    Current Risk:   Suicide: low   Homicide: low   Self-Harm: low   Safety Plan Reviewed: not applicable    Evaluation + Plan: Pt cont to engage therapeutic process, trying to succeed, be productive, make meaningful choices, behave maturely and responsibly, establish and maintain healthy and appropriate boundaries in all roles, but in particular, his work role, for example, being 'on-call, 24/5.' Essentially, he's got virtually no boundary around himself vis-a-vis his work, which causes impairment in his self-care and impedes on his family life. Concepts: boundaries/integrity/job description/work's core values and how these can guide Gil's doing his job. Pt states he feels he's making small but important gains.    Care Plan Updated: No    Does patient express agreement with the treatment plan? Yes     Diagnosis: F41.9 Unspecified Anxiety Disorder; Z63.4 Uncomplicated Grief     PASHA Bedoya

## 2024-04-09 ENCOUNTER — TELEMEDICINE (OUTPATIENT)
Dept: BEHAVIORAL HEALTH | Facility: CLINIC | Age: 49
End: 2024-04-09
Payer: COMMERCIAL

## 2024-04-09 DIAGNOSIS — F41.9 ANXIETY: ICD-10-CM

## 2024-04-09 DIAGNOSIS — Z63.4 BEREAVEMENT, UNCOMPLICATED: ICD-10-CM

## 2024-04-09 PROCEDURE — 90837 PSYTX W PT 60 MINUTES: CPT | Mod: 95 | Performed by: MARRIAGE & FAMILY THERAPIST

## 2024-04-09 SDOH — SOCIAL STABILITY - SOCIAL INSECURITY: DISSAPEARANCE AND DEATH OF FAMILY MEMBER: Z63.4

## 2024-04-09 NOTE — PROGRESS NOTES
[facility]   Therapy Progress Note        Name: Gil Mueller  MRN: [id number]  : 1975  Age: 48 y.o.  Date of assessment: 2024  PCP: Alex Morris M.D.  Persons in attendance: Patient  Total session time: 60 minutes    Pt reports on his efforts and activities over past week: Pt stated he got extension to be off work to be off work utilizing his short term disability benefit thru . He needs doc'n from his PCP to possibly extend his leave. He's waiting for appt with LOR Morris MD, to discuss this and to likely receive this doc'n.     Stressors:  Marriage relationship:  working on improving comm's, nurturing their relationship    Wife's health: She's making headway. Wife's wanting to begin work, she's been looking and applying for work    Daughter's schooling: She's now enrolled in a charter school for next school year, as a result of Pt and his wife working together to do what's best for their daughter    Gil's physical and emotional health: Pt attacking it from a number of angles. Feels his physical, mental health strengthening.     Living situation: Resolving as they're close to finding a new place    Family: Pt putting deliberate, focused attention to care for tend to family and family activities    Gil feels he's handling above stressors well.    Work: Pt's return to work? Ready to get back to work, in possibly three weeks, or more, but wants to further work on skills that he wants to practice while at work. Pt's back to work plan also includes meeting with admin at work and reestablish appropriate boundaries and job expectations. Pt expects to renegotiate and define his position, and have administration supporting him. That is: his role definition.    Objective Observations:   Participation:Active verbal participation   Grooming:Casual   Eye Contact:Good   Mood:Euthymic   Affect:Flexible   Thought Process:Goal-directed   Speech:Rate within normal limits    Current  Risk:   Suicide: low   Homicide: low   Self-Harm: low   Safety Plan Reviewed: not applicable    Evaluation: Pt describing his efforts and tangible steps toward reaching his goals, and the desired outcome he's experiencing    Care Plan Updated: No    Does patient express agreement with the treatment plan? Yes     Diagnosis: F41.9 Unspecified Anxiety Disorder; Z63.4 Uncomplicated Grief     PASHA Bedoya

## 2024-04-11 ENCOUNTER — TELEMEDICINE (OUTPATIENT)
Dept: INTERNAL MEDICINE | Facility: OTHER | Age: 49
End: 2024-04-11
Payer: COMMERCIAL

## 2024-04-11 ENCOUNTER — TELEPHONE (OUTPATIENT)
Dept: INTERNAL MEDICINE | Facility: OTHER | Age: 49
End: 2024-04-11

## 2024-04-11 VITALS — DIASTOLIC BLOOD PRESSURE: 101 MMHG | SYSTOLIC BLOOD PRESSURE: 140 MMHG

## 2024-04-11 DIAGNOSIS — F41.9 ANXIETY: ICD-10-CM

## 2024-04-11 DIAGNOSIS — Z12.11 SCREENING FOR COLON CANCER: ICD-10-CM

## 2024-04-11 DIAGNOSIS — K58.2 IRRITABLE BOWEL SYNDROME WITH BOTH CONSTIPATION AND DIARRHEA: ICD-10-CM

## 2024-04-11 PROCEDURE — 99213 OFFICE O/P EST LOW 20 MIN: CPT | Mod: GT | Performed by: INTERNAL MEDICINE

## 2024-04-11 NOTE — PROGRESS NOTES
Virtual Visit: Established Patient   This visit was conducted via Zoom using secure and encrypted videoconferencing technology.   The patient was in their home in the [facility].    The patient's identity was confirmed and verbal consent was obtained for this virtual visit.        Established Patient    Patient Care Team:  Alex Morris M.D. as PCP - General (Internal Medicine)    Gil Mueller is a 48 y.o. male who presents today with the following Chief Complaint(s):  Chief Complaint   Patient presents with    Paperwork    Hypertension     Patient checking blood pressure at home, blood pressure is high.     and for follow up for Diagnoses of Anxiety, Irritable bowel syndrome with both constipation and diarrhea, and Screening for colon cancer were pertinent to this visit.    ROS:     Denies any new chest pain or shortness of breath.  No changes to urinary function.  Changes to bowel movements as per HPI see HPI.    Past Medical History:   Diagnosis Date    Anesthesia     aggressive wake up     Lumbar herniated disc     Pain      Social History     Tobacco Use    Smoking status: Former     Current packs/day: 0.00     Types: Cigarettes     Quit date: 3/12/2004     Years since quittin.0    Smokeless tobacco: Current     Types: Chew   Vaping Use    Vaping Use: Never used   Substance Use Topics    Alcohol use: Yes     Alcohol/week: 0.0 oz     Comment: socially    Drug use: No     Comment: DC'd  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 daily      Current Outpatient Medications   Medication Sig Dispense Refill    hydrOXYzine HCl (ATARAX) 25 MG Tab Take 1 Tablet by mouth 3 times a day as needed for Anxiety. 60 Tablet 1     No current facility-administered medications for this visit.       Physical Exam:  BP (!) 140/101 (BP Location: Left arm, Patient Position: Sitting) Comment: done at home.  General: Well developed, well nourished male, in no distress.  Eyes: Conjuntiva without any obvious injection or erythema.     HPI /  Assessment / Plan:  1. Anxiety  Gil comes in primarily today for updated short-term disability paperwork.  He has been having significant issues with work-related anxiety since mid February.  Since his last visit with me he has now established care with a psychologist and is seeming to make some good progress.  I did encourage him today to ensure ongoing and active dialogue with his therapist to ensure the sessions continue to meet his needs.  Overall however he has progressing toward being able to return to work, and as of now has a date set to try to return to work of May 6, 2024.  Plan:  Continue follow-up with psychology  Paperwork filled out today for his short-term disability with an end date of May 6, 2024    2. Irritable bowel syndrome with both constipation and diarrhea  He notes for the last few weeks he has had some changes to his bowel movements.  He is having some alternating constipation and diarrhea.  He notes that there is significant association with his symptoms and increase stress.  No significant bleeding however he has history of hemorrhoids and notes a small amount of blood on the toilet paper.  No other weight loss fevers chills or other concerning symptoms.  Plan:  He should continue using fiber as he has found that to be helpful so far.  I think likely this is irritable bowel syndrome however given the fact that he is approaching 49, colonoscopy is appropriate for both screening and evaluation of ongoing issues.  - Referral to GI for Colonoscopy    3. Screening for colon cancer  - Referral to GI for Colonoscopy       Orders Placed This Encounter    Referral to GI for Colonoscopy       Return for visit as scheduled.    Alex Morris M.D.   of Internal Medicine  [facility]    This note was created using voice recognition software.  While every attempt is made to ensure accuracy of transcription, occasionally errors occur.    .cc

## 2024-04-11 NOTE — TELEPHONE ENCOUNTER
----- Message from Saundra Kaiser, Med Ass't sent at 4/11/2024  8:26 AM PDT -----  Regarding: FW: Insurance  Contact: [phone]    ----- Message -----  From: Tiffanie Miranda, Med Ass't  Sent: 4/9/2024   1:55 PM PDT  To: [facility] [address]   Subject: FW: Insurance                                    Please schedule a virtual, preferably last patient of the day if virtual.   ----- Message -----  From: Rose Hernández  Sent: 4/9/2024   1:52 PM PDT  To: [facility]  Subject: FW: Insurance                                      ----- Message -----  From: Gil Mueller  Sent: 4/9/2024  10:44 AM PDT  To: [facility]  Subject: Insurance                                        DR. Morris He currently has me out of work while going through therapy. They need to know if i am to stay off of work and the reason. Can we set up a virtual visit with the doctor.

## 2024-04-16 ENCOUNTER — APPOINTMENT (OUTPATIENT)
Dept: BEHAVIORAL HEALTH | Facility: CLINIC | Age: 49
End: 2024-04-16
Payer: COMMERCIAL

## 2024-04-16 ENCOUNTER — APPOINTMENT (OUTPATIENT)
Dept: INTERNAL MEDICINE | Facility: OTHER | Age: 49
End: 2024-04-16
Payer: COMMERCIAL

## 2024-04-22 ENCOUNTER — TELEPHONE (OUTPATIENT)
Dept: INTERNAL MEDICINE | Facility: OTHER | Age: 49
End: 2024-04-22
Payer: COMMERCIAL

## 2024-04-22 NOTE — TELEPHONE ENCOUNTER
----- Message from Mackenzie Collins sent at 4/22/2024 12:28 PM PDT -----  Regarding: FW: Insurance  Contact: [phone]    ----- Message -----  From: Gil Mueller  Sent: 4/22/2024  11:38 AM PDT  To: [facility]  Subject: Insurance                                        My company needs this filled out before i return to work.

## 2024-04-23 ENCOUNTER — TELEMEDICINE (OUTPATIENT)
Dept: BEHAVIORAL HEALTH | Facility: CLINIC | Age: 49
End: 2024-04-23
Payer: COMMERCIAL

## 2024-04-23 DIAGNOSIS — Z63.4 BEREAVEMENT, UNCOMPLICATED: ICD-10-CM

## 2024-04-23 DIAGNOSIS — F41.9 ANXIETY DISORDER, UNSPECIFIED TYPE: ICD-10-CM

## 2024-04-23 PROCEDURE — 90837 PSYTX W PT 60 MINUTES: CPT | Mod: 95 | Performed by: MARRIAGE & FAMILY THERAPIST

## 2024-04-23 SDOH — SOCIAL STABILITY - SOCIAL INSECURITY: DISSAPEARANCE AND DEATH OF FAMILY MEMBER: Z63.4

## 2024-04-23 NOTE — PROGRESS NOTES
[facility]   Therapy Progress Note        Name: Gil Mueller  MRN: [id number]  : 1975  Age: 48 y.o.  Date of assessment: 2024  PCP: Alex Morris M.D.  Persons in attendance: Patient  Total session time: 60 minutes    Pt reports: He missed last week's appt due to feeling under the weather, he apologized for the short notice cancelling.    Marriage: 'Going well!' Pt practicing mindful comm's btwn he and his wife, via writing to each other, in addition to verbal comm's. They're practicing new habits' toward relationship building, creating understanding.     Wife's health: She's continuing to care for her health, as is Pt. Her health is 'getting better.'    Living situation: Pt/wife still looking for the right place for them, they have until .    RTW: May 6, as agreed upon by Pt and his PCP. Pt is trying to arrange to meet with his supervisor/s to strategize his return to work, his responsibilities, expectations, feeling a lot of pushback from one HR person in particular. 'This may not be the place for me to further my career.' He has a specific plan as to how he's going to state his expectations, his needs, his requirements for his job upon hi return. Pt considering other job, in [place], is setting up interviews with other employers, with his wife's and his sister's support.    We also discussed how Gil can limit, better control his anxious thoughts, rumination, etc, by setting time limits, having other ways to distract himself, engage more rewarding activities.    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    Plan: Cont to meet weekly, until Pt returns to work, and then reevaluate     Care Plan Updated: No    Does patient express agreement with the treatment plan? Yes     Diagnosis: F41.9 Unspecified Anxiety Disorder; Z63.4 Uncomplicated Bereavement     PASHA Bedoya

## 2024-04-30 ENCOUNTER — TELEMEDICINE (OUTPATIENT)
Dept: BEHAVIORAL HEALTH | Facility: CLINIC | Age: 49
End: 2024-04-30
Payer: COMMERCIAL

## 2024-04-30 DIAGNOSIS — Z63.4 BEREAVEMENT, UNCOMPLICATED: ICD-10-CM

## 2024-04-30 DIAGNOSIS — F41.9 ANXIETY DISORDER, UNSPECIFIED TYPE: ICD-10-CM

## 2024-04-30 SDOH — SOCIAL STABILITY - SOCIAL INSECURITY: DISSAPEARANCE AND DEATH OF FAMILY MEMBER: Z63.4

## 2024-04-30 NOTE — PROGRESS NOTES
[facility]   Therapy Progress Note    Name: Gil Mueller  MRN: [id number]  : 1975  Age: 48 y.o.  Date of assessment: 2024  PCP: Alex Morris M.D.  Persons in attendance: Patient  Total session time: 51    Pt reports: Gil has tayla preparing for his return to work, by: working on NOT trying to predict what he's walking back into; Clarifying 'what I'm willing to accept;'  Being clear about personal and work boundaries; Cont to make himself and his family his priority.    Pt cont to take care of himself, attending to his physical health, working on how to manage his anxiety, eating well.    Pt also taking stock in his competencies he's got regarding his career, as a way to cont  to rebuild his self confidence and pride in his work, indeed, his worth.    Objective Observations:   Participation:Active verbal participation   Eye Contact:Good   Mood: Energized, optimistic   Affect:Flexible   Thought Process:Goal-directed   Speech:Rate within normal limits    Current Risk:   Suicide: low   Homicide: low   Self-Harm: low   Safety Plan Reviewed: no    Evaluation: Noted, definite, shift as improvement in Pt's attitude toward getting back to work, including, possibly even having to make the decision to resign. Pt is OK with this, he has articulated his worth, and he/wife have a back up plan, if he chooses to resign.    Plan: Gil to start work next Mon, May 6, and will have to schedule further appts around his work schedule.    Care Plan Updated: No    Does patient express agreement with the treatment plan? Yes     Diagnosis: F41.9 Unspecified Anxiety Disorder; Z63.4 Uncomplicated Bereavement     PASHA Bedoya

## 2024-05-07 NOTE — TELEPHONE ENCOUNTER
Received request via: Patient    Was the patient seen in the last year in this department? Yes    Does the patient have an active prescription (recently filled or refills available) for medication(s) requested? No    [facility]    Does the patient have residential Plus and need 100 day supply (blood pressure, diabetes and cholesterol meds only)? Patient does not have SCP

## 2024-05-08 ENCOUNTER — TELEMEDICINE (OUTPATIENT)
Dept: BEHAVIORAL HEALTH | Facility: CLINIC | Age: 49
End: 2024-05-08
Payer: COMMERCIAL

## 2024-05-08 DIAGNOSIS — F41.9 ANXIETY DISORDER, UNSPECIFIED TYPE: ICD-10-CM

## 2024-05-08 DIAGNOSIS — Z63.4 BEREAVEMENT, UNCOMPLICATED: ICD-10-CM

## 2024-05-08 PROCEDURE — 90837 PSYTX W PT 60 MINUTES: CPT | Mod: 95 | Performed by: MARRIAGE & FAMILY THERAPIST

## 2024-05-08 SDOH — SOCIAL STABILITY - SOCIAL INSECURITY: DISSAPEARANCE AND DEATH OF FAMILY MEMBER: Z63.4

## 2024-05-08 NOTE — PROGRESS NOTES
[facility]   Therapy Progress Note    Name: Gil Mueller  MRN: [id number]  : 1975  Age: 48 y.o.  Date of assessment: 2024  PCP: Alex Morris M.D.  Persons in attendance: Patient  Total session time: 55    Pt reports: he went to work, as scheduled. He had a mixed reception from coworkers, but mostly felt welcomed. Had one on one mtg with the [newer] supervisor who triggered the whole 'incident.'    Back to work 'has been pretty god.' Yet Pt finds himself cont to overthink issues such as his interactions with coworker, what they might think of him, how he interacts with others. Gil has no reservations or concerns about his getting the job done.    Pt felt he did a good job delivering his boundaries and expectations to his supervisor    Q: Any panic attacks?  A: No. I've been laser focused, on problems at work, problems in my everyday life. I DON'T treat my family the way I treat work.    Objective Observations:   Participation:Active verbal participation   Cognition:Fully Oriented   Mood:Euthymic   Affect:Full range   Thought Process:Goal-directed   Speech:Rate within normal limits    Current Risk:   Suicide: low   Homicide: low   Self-Harm: low   Safety Plan Reviewed: not applicable    Evaluation: Pt experienced his return to work as successful, his wife has cont to support him, he thanked me for my role in helping and supporting him, he feels good about how he's handled this transition    Care Plan Updated: No    Does patient express agreement with the treatment plan? Yes     Diagnosis: F41.9 Unspecified Anxiety Disorder; Z63.4 Uncomplicated Grief      PASHA Bedoya

## 2024-05-09 RX ORDER — HYDROXYZINE HYDROCHLORIDE 25 MG/1
25 TABLET, FILM COATED ORAL 3 TIMES DAILY PRN
Qty: 60 TABLET | Refills: 1 | Status: SHIPPED | OUTPATIENT
Start: 2024-05-09

## 2024-05-13 ENCOUNTER — APPOINTMENT (OUTPATIENT)
Dept: BEHAVIORAL HEALTH | Facility: CLINIC | Age: 49
End: 2024-05-13
Payer: COMMERCIAL

## 2024-05-13 NOTE — PROGRESS NOTES
[facility]   Therapy Progress Note    Name: Gil Mueller  MRN: [id number]  : 1975  Age: 48 y.o.  Date of assessment: 2024  PCP: Alex Morris M.D.  Persons in attendance: {[facility] HEALTH ATTENDEES:27910532}  Total session time: {***}    Pt reports: ***    Objective Observations:   Participation:{[facility] PARTICIPATION MEASURES:68217864}   Grooming:{[facility] GROOMIN}   Cognition:{[facility] ORIENTATION:71989722}   Eye Contact:{[facility] EYE CONTACT:62579151}   Mood:{[facility] MOOD:95310385}   Affect:{[facility] AFFECT:75728502}   Thought Process:{[facility] THOUGHT PROCESS:77164107}   Speech:{[facility] SPEECH:21225412}    Current Risk:   Suicide: {Desc; low/moderate/high:275491}   Homicide: {Desc; low/moderate/high:079985}   Self-Harm: {Desc; low/moderate/high:811649}   Safety Plan Reviewed: {Response; yes/no/na:44952}    Evaluation: ***    Plan: ***    Care Plan Updated: {Yes/No/WC:011592}    Does patient express agreement with the treatment plan? {Yes/No/WC:963462}     Diagnosis:     PASHA Bedoya

## 2024-05-22 ENCOUNTER — TELEMEDICINE (OUTPATIENT)
Dept: BEHAVIORAL HEALTH | Facility: CLINIC | Age: 49
End: 2024-05-22
Payer: COMMERCIAL

## 2024-05-22 DIAGNOSIS — F41.9 ANXIETY: ICD-10-CM

## 2024-05-22 DIAGNOSIS — Z63.4 BEREAVEMENT, UNCOMPLICATED: ICD-10-CM

## 2024-05-22 PROCEDURE — 90834 PSYTX W PT 45 MINUTES: CPT | Mod: 95 | Performed by: MARRIAGE & FAMILY THERAPIST

## 2024-05-22 SDOH — SOCIAL STABILITY - SOCIAL INSECURITY: DISSAPEARANCE AND DEATH OF FAMILY MEMBER: Z63.4

## 2024-05-22 NOTE — PROGRESS NOTES
[facility]   Therapy Progress Note    Name: Gil Mueller  MRN: [id number]  : 1975  Age: 49 y.o.  Date of assessment: 2024  PCP: Alex Morris M.D.  Persons in attendance: Patient  Total session time: 55 min    Pt reports: 'Work is good!, but some things about work are staying the same, regarding other's behaviors, but my boss is holding up his end of the bargain, in his role as the supervisor.' Pt adds 'I'm able to do my job!' Gil's supervisor, company is very much more responsive to him, his requests. He's not working the '24/5' hours that he was prior.    Panic attacks? Pt reports no panic attacks, but a couple times felt anxiety building, and relied on implementing coping strategies to manage, to get beyond feeling anxious.    Pt has plans to likely engage a round of marriage counseling for he and his wife; 'we're a long-time work in progress.'    Objective Observations:   Participation:Active verbal participation   Cognition:Alert   Eye Contact:Good   Mood:Euthymic   Affect:Flexible   Thought Process:Goal-directed   Speech: rate, volume, ignacia all WNL    Current Risk:   Suicide: low   Homicide: low   Self-Harm: low   Safety Plan Reviewed: not applicable    Evaluation: Pt doing well, every domain is moving in a good direction and 'the doreen is not falling!' Per Pt. He's quite satisfied with the improvements he's affected in his personal, marriage, and work functioning.    Plan: Pt to make another appt for about 2 weeks out, likely will pause services at that time    Care Plan Updated: Yes    Does patient express agreement with the treatment plan? Yes     Diagnosis:  F41.9 Unspecified Anxiety Disorder; Z63.4 Uncomplicated Grief     PASHA Bedoya

## 2024-05-30 ENCOUNTER — APPOINTMENT (OUTPATIENT)
Dept: INTERNAL MEDICINE | Facility: OTHER | Age: 49
End: 2024-05-30
Payer: COMMERCIAL

## 2024-06-17 ENCOUNTER — TELEPHONE (OUTPATIENT)
Dept: INTERNAL MEDICINE | Facility: OTHER | Age: 49
End: 2024-06-17
Payer: COMMERCIAL

## 2024-06-17 NOTE — TELEPHONE ENCOUNTER
Called patient to reschedule appt that was booked for 07/02/24.  He was upset that he keeps getting rescheduled.  I booked him for next available which is not until August 15th but he was hoping that he could get in sooner.

## 2024-06-18 NOTE — TELEPHONE ENCOUNTER
Med group scheduling called with pt on hold, he is returning our call about the double book appt.  will book appt as I stated per Dr. Isi WINTER, double booking is ok.

## 2024-06-19 NOTE — TELEPHONE ENCOUNTER
We have not gotten the ok to double book patient. I will be asking Dr. Morris if it is ok. For now, I will keep my eye on the schedule for any opening prior to patients scheduled appointment.         Dr. Morris, is it ok to double book?

## 2024-06-19 NOTE — TELEPHONE ENCOUNTER
Spoke to patient, offered 6/25/2024@8:40 am. Patient declined offer due to being available after 9 am. Will call patient if there is a cancellation past 9 am.

## 2024-07-02 ENCOUNTER — APPOINTMENT (OUTPATIENT)
Dept: INTERNAL MEDICINE | Facility: OTHER | Age: 49
End: 2024-07-02
Payer: COMMERCIAL

## 2024-07-05 RX ORDER — HYDROXYZINE HYDROCHLORIDE 25 MG/1
25 TABLET, FILM COATED ORAL 3 TIMES DAILY PRN
Qty: 60 TABLET | Refills: 1 | Status: SHIPPED | OUTPATIENT
Start: 2024-07-05

## 2024-07-10 PROCEDURE — 99285 EMERGENCY DEPT VISIT HI MDM: CPT

## 2024-07-10 PROCEDURE — 36415 COLL VENOUS BLD VENIPUNCTURE: CPT

## 2024-07-10 ASSESSMENT — PAIN DESCRIPTION - PAIN TYPE: TYPE: ACUTE PAIN

## 2024-07-11 ENCOUNTER — APPOINTMENT (OUTPATIENT)
Dept: RADIOLOGY | Facility: MEDICAL CENTER | Age: 49
End: 2024-07-11
Attending: STUDENT IN AN ORGANIZED HEALTH CARE EDUCATION/TRAINING PROGRAM

## 2024-07-11 ENCOUNTER — HOSPITAL ENCOUNTER (EMERGENCY)
Facility: MEDICAL CENTER | Age: 49
End: 2024-07-11
Attending: STUDENT IN AN ORGANIZED HEALTH CARE EDUCATION/TRAINING PROGRAM

## 2024-07-11 ENCOUNTER — PHARMACY VISIT (OUTPATIENT)
Dept: PHARMACY | Facility: MEDICAL CENTER | Age: 49
End: 2024-07-11
Payer: COMMERCIAL

## 2024-07-11 VITALS
HEART RATE: 100 BPM | DIASTOLIC BLOOD PRESSURE: 69 MMHG | BODY MASS INDEX: 42.95 KG/M2 | OXYGEN SATURATION: 91 % | WEIGHT: 290 LBS | SYSTOLIC BLOOD PRESSURE: 122 MMHG | RESPIRATION RATE: 16 BRPM | TEMPERATURE: 98.2 F | HEIGHT: 69 IN

## 2024-07-11 DIAGNOSIS — S22.32XA CLOSED FRACTURE OF ONE RIB OF LEFT SIDE, INITIAL ENCOUNTER: ICD-10-CM

## 2024-07-11 DIAGNOSIS — S09.90XA CLOSED HEAD INJURY, INITIAL ENCOUNTER: ICD-10-CM
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
## 2024-07-11 LAB
ALBUMIN SERPL BCP-MCNC: 4.3 G/DL (ref 3.2–4.9)
ALBUMIN/GLOB SERPL: 1.2 G/DL
ALP SERPL-CCNC: 89 U/L (ref 30–99)
ALT SERPL-CCNC: 72 U/L (ref 2–50)
ANION GAP SERPL CALC-SCNC: 12 MMOL/L (ref 7–16)
AST SERPL-CCNC: 43 U/L (ref 12–45)
BASOPHILS # BLD AUTO: 0.5 % (ref 0–1.8)
BASOPHILS # BLD: 0.1 K/UL (ref 0–0.12)
BILIRUB SERPL-MCNC: 0.4 MG/DL (ref 0.1–1.5)
BUN SERPL-MCNC: 8 MG/DL (ref 8–22)
CALCIUM ALBUM COR SERPL-MCNC: 9.2 MG/DL (ref 8.5–10.5)
CALCIUM SERPL-MCNC: 9.4 MG/DL (ref 8.5–10.5)
CHLORIDE SERPL-SCNC: 107 MMOL/L (ref 96–112)
CO2 SERPL-SCNC: 24 MMOL/L (ref 20–33)
CREAT SERPL-MCNC: 0.86 MG/DL (ref 0.5–1.4)
EOSINOPHIL # BLD AUTO: 0.03 K/UL (ref 0–0.51)
EOSINOPHIL NFR BLD: 0.1 % (ref 0–6.9)
ERYTHROCYTE [DISTWIDTH] IN BLOOD BY AUTOMATED COUNT: 48.1 FL (ref 35.9–50)
ETHANOL BLD-MCNC: 117.4 MG/DL
GFR SERPLBLD CREATININE-BSD FMLA CKD-EPI: 105 ML/MIN/1.73 M 2
GLOBULIN SER CALC-MCNC: 3.5 G/DL (ref 1.9–3.5)
GLUCOSE SERPL-MCNC: 138 MG/DL (ref 65–99)
HCT VFR BLD AUTO: 52.8 % (ref 42–52)
HGB BLD-MCNC: 17.9 G/DL (ref 14–18)
IMM GRANULOCYTES # BLD AUTO: 0.2 K/UL (ref 0–0.11)
IMM GRANULOCYTES NFR BLD AUTO: 0.9 % (ref 0–0.9)
LYMPHOCYTES # BLD AUTO: 1.26 K/UL (ref 1–4.8)
LYMPHOCYTES NFR BLD: 5.7 % (ref 22–41)
MCH RBC QN AUTO: 32.5 PG (ref 27–33)
MCHC RBC AUTO-ENTMCNC: 33.9 G/DL (ref 32.3–36.5)
MCV RBC AUTO: 96 FL (ref 81.4–97.8)
MONOCYTES # BLD AUTO: 0.92 K/UL (ref 0–0.85)
MONOCYTES NFR BLD AUTO: 4.2 % (ref 0–13.4)
NEUTROPHILS # BLD AUTO: 19.47 K/UL (ref 1.82–7.42)
NEUTROPHILS NFR BLD: 88.6 % (ref 44–72)
NRBC # BLD AUTO: 0 K/UL
NRBC BLD-RTO: 0 /100 WBC (ref 0–0.2)
PLATELET # BLD AUTO: 220 K/UL (ref 164–446)
PMV BLD AUTO: 12.2 FL (ref 9–12.9)
POTASSIUM SERPL-SCNC: 4.1 MMOL/L (ref 3.6–5.5)
PROT SERPL-MCNC: 7.8 G/DL (ref 6–8.2)
RBC # BLD AUTO: 5.5 M/UL (ref 4.7–6.1)
SODIUM SERPL-SCNC: 143 MMOL/L (ref 135–145)
WBC # BLD AUTO: 22 K/UL (ref 4.8–10.8)

## 2024-07-11 PROCEDURE — A9270 NON-COVERED ITEM OR SERVICE: HCPCS | Performed by: STUDENT IN AN ORGANIZED HEALTH CARE EDUCATION/TRAINING PROGRAM

## 2024-07-11 PROCEDURE — 96376 TX/PRO/DX INJ SAME DRUG ADON: CPT

## 2024-07-11 PROCEDURE — 96375 TX/PRO/DX INJ NEW DRUG ADDON: CPT

## 2024-07-11 PROCEDURE — 700102 HCHG RX REV CODE 250 W/ 637 OVERRIDE(OP): Performed by: STUDENT IN AN ORGANIZED HEALTH CARE EDUCATION/TRAINING PROGRAM

## 2024-07-11 PROCEDURE — 700111 HCHG RX REV CODE 636 W/ 250 OVERRIDE (IP): Mod: JZ | Performed by: STUDENT IN AN ORGANIZED HEALTH CARE EDUCATION/TRAINING PROGRAM

## 2024-07-11 PROCEDURE — 82077 ASSAY SPEC XCP UR&BREATH IA: CPT

## 2024-07-11 PROCEDURE — 80053 COMPREHEN METABOLIC PANEL: CPT

## 2024-07-11 PROCEDURE — 96374 THER/PROPH/DIAG INJ IV PUSH: CPT

## 2024-07-11 PROCEDURE — 700117 HCHG RX CONTRAST REV CODE 255: Performed by: STUDENT IN AN ORGANIZED HEALTH CARE EDUCATION/TRAINING PROGRAM

## 2024-07-11 PROCEDURE — 71260 CT THORAX DX C+: CPT

## 2024-07-11 PROCEDURE — 85025 COMPLETE CBC W/AUTO DIFF WBC: CPT

## 2024-07-11 PROCEDURE — 36415 COLL VENOUS BLD VENIPUNCTURE: CPT

## 2024-07-11 PROCEDURE — 72125 CT NECK SPINE W/O DYE: CPT

## 2024-07-11 PROCEDURE — 70450 CT HEAD/BRAIN W/O DYE: CPT

## 2024-07-11 PROCEDURE — RXMED WILLOW AMBULATORY MEDICATION CHARGE: Performed by: STUDENT IN AN ORGANIZED HEALTH CARE EDUCATION/TRAINING PROGRAM

## 2024-07-11 RX ORDER — ACETAMINOPHEN 500 MG
1000 TABLET ORAL ONCE
Status: COMPLETED | OUTPATIENT
Start: 2024-07-11 | End: 2024-07-11

## 2024-07-11 RX ORDER — ONDANSETRON 4 MG/1
4 TABLET, ORALLY DISINTEGRATING ORAL EVERY 6 HOURS PRN
Qty: 10 TABLET | Refills: 0 | Status: SHIPPED | OUTPATIENT
Start: 2024-07-11

## 2024-07-11 RX ORDER — MORPHINE SULFATE 15 MG/1
7.5 TABLET ORAL EVERY 6 HOURS PRN
Qty: 12 TABLET | Refills: 0 | Status: SHIPPED | OUTPATIENT
Start: 2024-07-11 | End: 2024-07-15

## 2024-07-11 RX ORDER — ONDANSETRON 2 MG/ML
4 INJECTION INTRAMUSCULAR; INTRAVENOUS ONCE
Status: COMPLETED | OUTPATIENT
Start: 2024-07-11 | End: 2024-07-11

## 2024-07-11 RX ORDER — MORPHINE SULFATE 4 MG/ML
4 INJECTION INTRAVENOUS ONCE
Status: COMPLETED | OUTPATIENT
Start: 2024-07-11 | End: 2024-07-11

## 2024-07-11 RX ORDER — KETOROLAC TROMETHAMINE 15 MG/ML
15 INJECTION, SOLUTION INTRAMUSCULAR; INTRAVENOUS ONCE
Status: COMPLETED | OUTPATIENT
Start: 2024-07-11 | End: 2024-07-11

## 2024-07-11 RX ORDER — LIDOCAINE 50 MG/G
1 PATCH TOPICAL EVERY 24 HOURS
Qty: 10 PATCH | Refills: 0 | Status: SHIPPED | OUTPATIENT
Start: 2024-07-11

## 2024-07-11 RX ADMIN — MORPHINE SULFATE 4 MG: 4 INJECTION INTRAVENOUS at 00:43

## 2024-07-11 RX ADMIN — KETOROLAC TROMETHAMINE 15 MG: 15 INJECTION, SOLUTION INTRAMUSCULAR; INTRAVENOUS at 03:30

## 2024-07-11 RX ADMIN — MORPHINE SULFATE 4 MG: 4 INJECTION, SOLUTION INTRAMUSCULAR; INTRAVENOUS at 01:30

## 2024-07-11 RX ADMIN — ACETAMINOPHEN 1000 MG: 500 TABLET ORAL at 03:29

## 2024-07-11 RX ADMIN — IOHEXOL 75 ML: 350 INJECTION, SOLUTION INTRAVENOUS at 02:04

## 2024-07-11 RX ADMIN — ONDANSETRON 4 MG: 2 INJECTION INTRAMUSCULAR; INTRAVENOUS at 03:30

## 2024-07-11 RX ADMIN — ONDANSETRON 4 MG: 2 INJECTION INTRAMUSCULAR; INTRAVENOUS at 00:43

## 2024-07-11 ASSESSMENT — PAIN DESCRIPTION - PAIN TYPE: TYPE: ACUTE PAIN

## (undated) DEVICE — LEAD SET 6 DISP. EKG NIHON KOHDEN

## (undated) DEVICE — SPONGE GAUZESTER 4 X 4 4PLY - (128PK/CA)

## (undated) DEVICE — KIT SURGIFLO W/OUT THROMBIN - (6EA/CA)

## (undated) DEVICE — HEADREST PRONEVIEW LARGE - (10/CA)

## (undated) DEVICE — MIDAS LUBRICATOR DIFFUSER PACK (4EA/CA)

## (undated) DEVICE — DRESSING XEROFORM 1X8 - (50/BX 4BX/CA)

## (undated) DEVICE — SUCTION TIP STRAIGHT ARGYLE - 50EA/CA

## (undated) DEVICE — KIT EVACUATER 3 SPRING PVC LF 1/8 DRAIN SIZE (10EA/CA)"

## (undated) DEVICE — COVER MAYO STAND X-LG - (22EA/CA)

## (undated) DEVICE — ELECTRODE 850 FOAM ADHESIVE - HYDROGEL RADIOTRNSPRNT (50/PK)

## (undated) DEVICE — ARMREST CRADLE FOAM - (2PR/PK 12PR/CA)

## (undated) DEVICE — CHLORAPREP 26 ML APPLICATOR - ORANGE TINT(25/CA)

## (undated) DEVICE — BOVIE, HAND VALLEYLAB SUCTION E2612-6

## (undated) DEVICE — NEPTUNE 4 PORT MANIFOLD - (20/PK)

## (undated) DEVICE — GLOVE BIOGEL SZ 8 SURGICAL PF LTX - (50PR/BX 4BX/CA)

## (undated) DEVICE — SLEEVE, VASO, THIGH, MED

## (undated) DEVICE — GLOVE BIOGEL PI INDICATOR SZ 7.0 SURGICAL PF LF - (50/BX 4BX/CA)

## (undated) DEVICE — MASK ANESTHESIA ADULT  - (100/CA)

## (undated) DEVICE — CANISTER SUCTION 3000ML MECHANICAL FILTER AUTO SHUTOFF MEDI-VAC NONSTERILE LF DISP  (40EA/CA)

## (undated) DEVICE — PACK JACKSON TABLE KIT W/OUT - HR (6EA/CA)

## (undated) DEVICE — TAPE CLOTH MEDIPORE 6 INCH - (12RL/CA)

## (undated) DEVICE — DRAPE 36X28IN RAD CARM BND BG - (25/CA) O

## (undated) DEVICE — SUTURE 0 VICRYL PLUS CT-1 - 8 X 18 INCH (12/BX)

## (undated) DEVICE — SUTURE 4-0 VICRYL PLUS FS-2 - 27 INCH (36/BX)

## (undated) DEVICE — BANDAGE ROLL STERILE BULKEE 4.5 IN X 4 YD (100EA/CA)

## (undated) DEVICE — DRAPE MICROSCOPE X-LONG (10EA/CA)

## (undated) DEVICE — SENSOR SPO2 NEO LNCS ADHESIVE (20/BX) SEE USER NOTES

## (undated) DEVICE — LACTATED RINGERS INJ. 500 ML - (24EA/CA)

## (undated) DEVICE — GLOVE BIOGEL SZ 7 SURGICAL PF LTX - (50PR/BX 4BX/CA)

## (undated) DEVICE — DRAPE LAPAROTOMY T SHEET - (12EA/CA)

## (undated) DEVICE — DRAPE LARGE 3 QUARTER - (20/CA)

## (undated) DEVICE — GLOVE BIOGEL SZ 8.5 SURGICAL PF LTX - (50PR/BX 4BX/CA)

## (undated) DEVICE — TUBING CLEARLINK DUO-VENT - C-FLO (48EA/CA)

## (undated) DEVICE — PACK NEURO - (2EA/CA)

## (undated) DEVICE — WATER IRRIG. STER. 1500 ML - (9/CA)

## (undated) DEVICE — DRAPE STRLE REG TOWEL 18X24 - (10/BX 4BX/CA)"

## (undated) DEVICE — PROTECTOR ULNA NERVE - (36PR/CA)

## (undated) DEVICE — SUCTION INSTRUMENT YANKAUER BULBOUS TIP W/O VENT (50EA/CA)

## (undated) DEVICE — KIT ANESTHESIA W/CIRCUIT & 3/LT BAG W/FILTER (20EA/CA)

## (undated) DEVICE — KIT ROOM DECONTAMINATION

## (undated) DEVICE — SUTURE GENERAL

## (undated) DEVICE — LACTATED RINGERS INJ 1000 ML - (14EA/CA 60CA/PF)

## (undated) DEVICE — SUTURE 0 VICRYL PLUS CT-2 - 8 X 18 INCH (12/BX)

## (undated) DEVICE — SET EXTENSION WITH 2 PORTS (48EA/CA) ***PART #2C8610 IS A SUBSTITUTE*****

## (undated) DEVICE — SURGIFOAM (12X7) - (12EA/CA)

## (undated) DEVICE — GLOVE BIOGEL INDICATOR SZ 8.5 SURGICAL PF LTX - (50/BX 4BX/CA)

## (undated) DEVICE — SPONGE PEANUT - (5/PK 50PK/CA)

## (undated) DEVICE — GOWN SURGEONS X-LARGE - DISP. (30/CA)

## (undated) DEVICE — GOWN WARMING STANDARD FLEX - (30/CA)

## (undated) DEVICE — GLOVE BIOGEL INDICATOR SZ 8 SURGICAL PF LTX - (50/BX 4BX/CA)

## (undated) DEVICE — ELECTRODE DUAL RETURN W/ CORD - (50/PK)

## (undated) DEVICE — TUBING C&T SET FLYING LEADS DRAIN TUBING (10EA/BX)

## (undated) DEVICE — SODIUM CHL IRRIGATION 0.9% 1000ML (12EA/CA)

## (undated) DEVICE — STAPLER SKIN DISP - (6/BX 10BX/CA) VISISTAT

## (undated) DEVICE — SUTURE 2-0 VICRYL PLUS SH - 8 X 18 INCH (12/BX)

## (undated) DEVICE — TUBE E-T HI-LO CUFF 8.0MM (10EA/PK)

## (undated) DEVICE — CATHETER IV 14 GA X 2 ---SURG.& SDS ONLY---(200EA/CA)

## (undated) DEVICE — SYRINGE SAFETY 10 ML 18 GA X 1 1/2 BLUNT LL (100/BX 4BX/CA)

## (undated) DEVICE — BLADE SURGICAL CLIPPER - (50EA/CA)

## (undated) DEVICE — SET LEADWIRE 5 LEAD BEDSIDE DISPOSABLE ECG (1SET OF 5/EA)

## (undated) DEVICE — TOOL DISSECT MATCH HEAD

## (undated) DEVICE — NEEDLE SPINAL NON-SAFETY 18 GA X 3 IN (25EA/BX)